# Patient Record
Sex: MALE | Race: WHITE | URBAN - METROPOLITAN AREA
[De-identification: names, ages, dates, MRNs, and addresses within clinical notes are randomized per-mention and may not be internally consistent; named-entity substitution may affect disease eponyms.]

---

## 2018-08-25 ENCOUNTER — EMERGENCY (EMERGENCY)
Facility: HOSPITAL | Age: 33
LOS: 0 days | Discharge: HOME | End: 2018-08-25
Attending: EMERGENCY MEDICINE | Admitting: EMERGENCY MEDICINE

## 2018-08-25 VITALS
TEMPERATURE: 98 F | SYSTOLIC BLOOD PRESSURE: 161 MMHG | DIASTOLIC BLOOD PRESSURE: 99 MMHG | OXYGEN SATURATION: 97 % | RESPIRATION RATE: 20 BRPM | HEART RATE: 102 BPM

## 2018-08-25 VITALS
OXYGEN SATURATION: 96 % | RESPIRATION RATE: 20 BRPM | DIASTOLIC BLOOD PRESSURE: 84 MMHG | SYSTOLIC BLOOD PRESSURE: 137 MMHG | HEART RATE: 80 BPM

## 2018-08-25 DIAGNOSIS — R60.0 LOCALIZED EDEMA: ICD-10-CM

## 2018-08-25 DIAGNOSIS — M79.89 OTHER SPECIFIED SOFT TISSUE DISORDERS: ICD-10-CM

## 2018-08-25 LAB
ALBUMIN SERPL ELPH-MCNC: 4.4 G/DL — SIGNIFICANT CHANGE UP (ref 3.5–5.2)
ALP SERPL-CCNC: 97 U/L — SIGNIFICANT CHANGE UP (ref 30–115)
ALT FLD-CCNC: 63 U/L — HIGH (ref 0–41)
ANION GAP SERPL CALC-SCNC: 11 MMOL/L — SIGNIFICANT CHANGE UP (ref 7–14)
APPEARANCE UR: CLEAR — SIGNIFICANT CHANGE UP
AST SERPL-CCNC: 50 U/L — HIGH (ref 0–41)
BASE EXCESS BLDA CALC-SCNC: 3.8 MMOL/L — HIGH (ref -2–2)
BASOPHILS # BLD AUTO: 0.06 K/UL — SIGNIFICANT CHANGE UP (ref 0–0.2)
BASOPHILS NFR BLD AUTO: 0.6 % — SIGNIFICANT CHANGE UP (ref 0–1)
BILIRUB SERPL-MCNC: 0.5 MG/DL — SIGNIFICANT CHANGE UP (ref 0.2–1.2)
BILIRUB UR-MCNC: NEGATIVE — SIGNIFICANT CHANGE UP
BUN SERPL-MCNC: 8 MG/DL — LOW (ref 10–20)
CALCIUM SERPL-MCNC: 9.6 MG/DL — SIGNIFICANT CHANGE UP (ref 8.5–10.1)
CHLORIDE SERPL-SCNC: 98 MMOL/L — SIGNIFICANT CHANGE UP (ref 98–110)
CO2 SERPL-SCNC: 30 MMOL/L — SIGNIFICANT CHANGE UP (ref 17–32)
COLOR SPEC: YELLOW — SIGNIFICANT CHANGE UP
CREAT SERPL-MCNC: 1.1 MG/DL — SIGNIFICANT CHANGE UP (ref 0.7–1.5)
DIFF PNL FLD: NEGATIVE — SIGNIFICANT CHANGE UP
EOSINOPHIL # BLD AUTO: 0.28 K/UL — SIGNIFICANT CHANGE UP (ref 0–0.7)
EOSINOPHIL NFR BLD AUTO: 2.7 % — SIGNIFICANT CHANGE UP (ref 0–8)
GAS PNL BLDV: SIGNIFICANT CHANGE UP
GAS PNL BLDV: SIGNIFICANT CHANGE UP
GLUCOSE SERPL-MCNC: 128 MG/DL — HIGH (ref 70–99)
GLUCOSE UR QL: NEGATIVE MG/DL — SIGNIFICANT CHANGE UP
HCO3 BLDA-SCNC: 28 MMOL/L — HIGH (ref 23–27)
HCT VFR BLD CALC: 47.4 % — SIGNIFICANT CHANGE UP (ref 42–52)
HGB BLD-MCNC: 16.1 G/DL — SIGNIFICANT CHANGE UP (ref 14–18)
HOROWITZ INDEX BLDA+IHG-RTO: 30 — SIGNIFICANT CHANGE UP
IMM GRANULOCYTES NFR BLD AUTO: 0.5 % — HIGH (ref 0.1–0.3)
KETONES UR-MCNC: NEGATIVE — SIGNIFICANT CHANGE UP
LEUKOCYTE ESTERASE UR-ACNC: NEGATIVE — SIGNIFICANT CHANGE UP
LYMPHOCYTES # BLD AUTO: 19.3 % — LOW (ref 20.5–51.1)
LYMPHOCYTES # BLD AUTO: 2 K/UL — SIGNIFICANT CHANGE UP (ref 1.2–3.4)
MCHC RBC-ENTMCNC: 28.9 PG — SIGNIFICANT CHANGE UP (ref 27–31)
MCHC RBC-ENTMCNC: 34 G/DL — SIGNIFICANT CHANGE UP (ref 32–37)
MCV RBC AUTO: 85.1 FL — SIGNIFICANT CHANGE UP (ref 80–94)
MONOCYTES # BLD AUTO: 0.72 K/UL — HIGH (ref 0.1–0.6)
MONOCYTES NFR BLD AUTO: 6.9 % — SIGNIFICANT CHANGE UP (ref 1.7–9.3)
NEUTROPHILS # BLD AUTO: 7.25 K/UL — HIGH (ref 1.4–6.5)
NEUTROPHILS NFR BLD AUTO: 70 % — SIGNIFICANT CHANGE UP (ref 42.2–75.2)
NITRITE UR-MCNC: NEGATIVE — SIGNIFICANT CHANGE UP
PCO2 BLDA: 42 MMHG — SIGNIFICANT CHANGE UP (ref 38–42)
PH BLDA: 7.44 — HIGH (ref 7.38–7.42)
PH UR: 6 — SIGNIFICANT CHANGE UP (ref 5–8)
PLATELET # BLD AUTO: 265 K/UL — SIGNIFICANT CHANGE UP (ref 130–400)
PO2 BLDA: 104 MMHG — HIGH (ref 78–95)
POTASSIUM SERPL-MCNC: 4 MMOL/L — SIGNIFICANT CHANGE UP (ref 3.5–5)
POTASSIUM SERPL-SCNC: 4 MMOL/L — SIGNIFICANT CHANGE UP (ref 3.5–5)
PROT SERPL-MCNC: 7.7 G/DL — SIGNIFICANT CHANGE UP (ref 6–8)
PROT UR-MCNC: ABNORMAL MG/DL
RBC # BLD: 5.57 M/UL — SIGNIFICANT CHANGE UP (ref 4.7–6.1)
RBC # FLD: 13.2 % — SIGNIFICANT CHANGE UP (ref 11.5–14.5)
SAO2 % BLDA: 98 % — SIGNIFICANT CHANGE UP (ref 94–98)
SODIUM SERPL-SCNC: 139 MMOL/L — SIGNIFICANT CHANGE UP (ref 135–146)
SP GR SPEC: 1.02 — SIGNIFICANT CHANGE UP (ref 1.01–1.03)
UROBILINOGEN FLD QL: 0.2 MG/DL — SIGNIFICANT CHANGE UP (ref 0.2–0.2)
WBC # BLD: 10.36 K/UL — SIGNIFICANT CHANGE UP (ref 4.8–10.8)
WBC # FLD AUTO: 10.36 K/UL — SIGNIFICANT CHANGE UP (ref 4.8–10.8)

## 2018-08-25 RX ORDER — DEXTROSE 50 % IN WATER 50 %
50 SYRINGE (ML) INTRAVENOUS ONCE
Qty: 0 | Refills: 0 | Status: COMPLETED | OUTPATIENT
Start: 2018-08-25 | End: 2018-08-25

## 2018-08-25 RX ORDER — SODIUM CHLORIDE 9 MG/ML
1000 INJECTION INTRAMUSCULAR; INTRAVENOUS; SUBCUTANEOUS ONCE
Qty: 0 | Refills: 0 | Status: COMPLETED | OUTPATIENT
Start: 2018-08-25 | End: 2018-08-25

## 2018-08-25 RX ORDER — INSULIN HUMAN 100 [IU]/ML
10 INJECTION, SOLUTION SUBCUTANEOUS ONCE
Qty: 0 | Refills: 0 | Status: COMPLETED | OUTPATIENT
Start: 2018-08-25 | End: 2018-08-25

## 2018-08-25 RX ORDER — IPRATROPIUM/ALBUTEROL SULFATE 18-103MCG
3 AEROSOL WITH ADAPTER (GRAM) INHALATION
Qty: 0 | Refills: 0 | Status: COMPLETED | OUTPATIENT
Start: 2018-08-25 | End: 2018-08-25

## 2018-08-25 NOTE — ED PROVIDER NOTE - PROGRESS NOTE DETAILS
Pt with hyperkalemia on initial CMP and VBG. Normal values obtained on subsequent VBG and ABG without interventions given to lower K.

## 2018-08-25 NOTE — ED ADULT NURSE REASSESSMENT NOTE - NS ED NURSE REASSESS COMMENT FT1
Pt assessed, aox4, cardiac monitored. HR 72 in normal sinus rhythm. Pt denies chest pain, SOB, dizziness. Pt placed on cpap machine oxygen saturation 97% no respiratory distress noted. Pt resting comfortably, safety precautions maintained.

## 2018-08-25 NOTE — ED PROVIDER NOTE - NS ED ROS FT
Constitutional: No fever  ENMT:  No sore throat  Cardiac:  No chest pain  Respiratory:  No cough, SOB  GI:  No nausea, vomiting, diarrhea, abdominal pain  :  No dysuria  MS:  +hand and feet swelling  Neuro:  No headache or lightheadedness  Skin: +rash  Endocrine: No history of thyroid disease or diabetes

## 2018-08-25 NOTE — ED PROVIDER NOTE - PHYSICAL EXAMINATION
Vital signs reviewed  GENERAL: Patient well appearing, NAD  HEAD: NCAT  EYES: Anicteric  ENT: MMM  NECK: Supple, non tender  RESPIRATORY: Normal respiratory effort. CTA B/L. No wheezing, rales, rhonchi  CARDIOVASCULAR: Regular rate and rhythm. No murmurs, rubs or gallops  ABDOMEN: Soft. Nondistended. Nontender. No guarding or rebound  MUSCULOSKELETAL/EXTREMITIES: Brisk cap refill. 2+ radial pulses. B/L hand and feet nonpitting edema. Normal ROM of extremities, 5/5 strength  SKIN:  Warm and dry. No acute rash  NEURO: AAOx3. No gross FND  PSYCHIATRIC: Cooperative. Affect appropriate

## 2018-08-25 NOTE — ED PROVIDER NOTE - ATTENDING CONTRIBUTION TO CARE
32M to ED with swelling to hands and feet x 1d, no fevers, no sick contacts, no travels.   pt has been working out door a lot but denies any trauma or pain. No h/o focal neuro sx, no known kidney dz.     AVSS, exam as noted, CTAB, RRR, abdomen soft NTND, (+) bowel sounds, neuro nonfocal , mild edema to hands and feet, not pitting, no oral edema, no scrotal edema.

## 2018-08-25 NOTE — ED ADULT NURSE NOTE - CHPI ED NUR SYMPTOMS NEG
swelling of lower and upper extremities/no fever/no loss of consciousness/no decreased eating/drinking/no back pain/no chills/no headache/no pain/no nausea/no vomiting/no dizziness

## 2018-08-25 NOTE — ED PROVIDER NOTE - OBJECTIVE STATEMENT
32M with no sig PMHx p/w swelling of hands and feet since yesterday. Pt noticed his bracelet was feeling tight around his wrist and had difficulty getting on his shoes. Having discomfort but not pain from the swelling. No trauma. Denies fever, joint pain, myalgia, rash. Denies headache, lightheadedness, CP, SOB, cough, nausea, vomiting, diarrhea, abd pain. No recent illness or travel.

## 2018-12-23 ENCOUNTER — EMERGENCY (EMERGENCY)
Facility: HOSPITAL | Age: 33
LOS: 0 days | Discharge: HOME | End: 2018-12-23
Admitting: EMERGENCY MEDICINE

## 2018-12-23 VITALS
RESPIRATION RATE: 18 BRPM | DIASTOLIC BLOOD PRESSURE: 85 MMHG | SYSTOLIC BLOOD PRESSURE: 134 MMHG | OXYGEN SATURATION: 97 % | TEMPERATURE: 97 F | HEART RATE: 87 BPM

## 2018-12-23 DIAGNOSIS — M25.562 PAIN IN LEFT KNEE: ICD-10-CM
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
## 2018-12-23 DIAGNOSIS — M54.2 CERVICALGIA: ICD-10-CM
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
## 2018-12-23 RX ORDER — IBUPROFEN 200 MG
800 TABLET ORAL ONCE
Qty: 0 | Refills: 0 | Status: COMPLETED | OUTPATIENT
Start: 2018-12-23 | End: 2018-12-23

## 2018-12-23 RX ORDER — METHOCARBAMOL 500 MG/1
1000 TABLET, FILM COATED ORAL ONCE
Qty: 0 | Refills: 0 | Status: COMPLETED | OUTPATIENT
Start: 2018-12-23 | End: 2018-12-23

## 2018-12-23 RX ORDER — IBUPROFEN 200 MG
1 TABLET ORAL
Qty: 21 | Refills: 0
Start: 2018-12-23 | End: 2018-12-29

## 2018-12-23 RX ORDER — METHOCARBAMOL 500 MG/1
2 TABLET, FILM COATED ORAL
Qty: 30 | Refills: 0
Start: 2018-12-23 | End: 2018-12-27

## 2018-12-23 RX ADMIN — METHOCARBAMOL 1000 MILLIGRAM(S): 500 TABLET, FILM COATED ORAL at 13:39

## 2018-12-23 RX ADMIN — Medication 800 MILLIGRAM(S): at 13:39
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
## 2018-12-23 NOTE — ED PROVIDER NOTE - CARE PROVIDER_API CALL
Ernesto Millard (MD), Orthopaedic Surgery  [facility] [place] [address]  Phone: [phone]  Fax: [phone]
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
## 2019-02-25 PROBLEM — Z00.00 ENCOUNTER FOR PREVENTIVE HEALTH EXAMINATION: Status: ACTIVE | Noted: 2019-02-25

## 2019-03-26 ENCOUNTER — TRANSCRIPTION ENCOUNTER (OUTPATIENT)
Age: 34
End: 2019-03-26

## 2019-03-26 ENCOUNTER — APPOINTMENT (OUTPATIENT)
Dept: SURGERY | Facility: CLINIC | Age: 34
End: 2019-03-26
Payer: SELF-PAY

## 2019-03-26 VITALS
DIASTOLIC BLOOD PRESSURE: 90 MMHG | WEIGHT: 315 LBS | HEIGHT: 72.5 IN | BODY MASS INDEX: 42.2 KG/M2 | SYSTOLIC BLOOD PRESSURE: 139 MMHG

## 2019-03-26 PROCEDURE — SI006: CPT

## 2019-03-28 ENCOUNTER — TRANSCRIPTION ENCOUNTER (OUTPATIENT)
Age: 34
End: 2019-03-28

## 2019-04-10 ENCOUNTER — APPOINTMENT (OUTPATIENT)
Dept: SURGERY | Facility: CLINIC | Age: 34
End: 2019-04-10
Payer: COMMERCIAL

## 2019-04-10 VITALS
HEIGHT: 72.5 IN | WEIGHT: 315 LBS | BODY MASS INDEX: 42.2 KG/M2 | DIASTOLIC BLOOD PRESSURE: 86 MMHG | SYSTOLIC BLOOD PRESSURE: 138 MMHG

## 2019-04-10 DIAGNOSIS — Z83.3 FAMILY HISTORY OF DIABETES MELLITUS: ICD-10-CM

## 2019-04-10 PROCEDURE — 99204 OFFICE O/P NEW MOD 45 MIN: CPT

## 2019-04-16 NOTE — PHYSICAL EXAM
[Normal Heart Sounds] : normal heart sounds [No Rash or Lesion] : No rash or lesion [Alert] : alert [Calm] : calm [de-identified] : no acute distress [de-identified] : supple [de-identified] : NC/AT [de-identified] : CTA B/L [de-identified] : soft, obese, non-tender, non-distended, no rebound/guarding, no hernias [de-identified] : no CVA tenderness [de-identified] : deferred [de-identified] : full ROm x 4, 5+ strength x 4

## 2019-04-16 NOTE — CONSULT LETTER
[Consult Letter:] : I had the pleasure of evaluating your patient, [unfilled]. [Please see my note below.] : Please see my note below. [Consult Closing:] : Thank you very much for allowing me to participate in the care of this patient.  If you have any questions, please do not hesitate to contact me. [Sincerely,] : Sincerely, [Dear  ___] : Dear  [unfilled], [FreeTextEntry3] : Clover Roque MD\par Bariatric & Minimally Invasive Surgery\par [facility]\par [phone]

## 2019-04-16 NOTE — ASSESSMENT
[FreeTextEntry1] : 34yo male with PMHx of HTN presenting for weight loss surgery consultation. \par -discussed surgical options - gastric band, sleeve gastrectomy, carey-en-Y gastric bypass\par -discussed potential surgical complications for each option\par -discussed smoking is prohibited and recommended cessation - referred to [facility] \par -recommended high protein, low carb, low fat diet\par -encouraged exercise regimen - 30 minutes per day, 3 times per week\par -at this time, I recommend SLEEVE GASTRECTOMY for this particular patient\par -pre-operative preparation - will require PCP evaluation, EGD, nutritional evaluation (3 months), sleep study\par -return to office after further preoperative evaluation is completed\par

## 2019-04-16 NOTE — REVIEW OF SYSTEMS
[SOB on Exertion] : shortness of breath during exertion [Joint Pain] : joint pain [As Noted in HPI] : as noted in HPI [Negative] : Heme/Lymph [de-identified] : depression requiring hospitalization 2009

## 2019-04-16 NOTE — HISTORY OF PRESENT ILLNESS
[de-identified] : 32yo male with PMHx of HTN, SHAKIR, and history of depression BMI 45 presenting for consultation of weight loss surgery/management. Patient states he has struggled with his weight for years. He has a tendency to eat sweets. He feels that contributing factors to his poor diet and lack of exercise is limited time, general stress, and lack of motivation. Previous weight loss attempts include P906 and various gym memberships with limited success. At this time, he does not have a regular exercise regimen.\par \par Previous EGD - never\par Smoker - 1/2 ppd x 12 years

## 2019-04-30 ENCOUNTER — APPOINTMENT (OUTPATIENT)
Dept: SURGERY | Facility: CLINIC | Age: 34
End: 2019-04-30
Payer: MEDICARE

## 2019-04-30 VITALS — HEIGHT: 72.5 IN | WEIGHT: 315 LBS | BODY MASS INDEX: 42.2 KG/M2

## 2019-04-30 PROCEDURE — 99212 OFFICE O/P EST SF 10 MIN: CPT

## 2019-05-05 ENCOUNTER — EMERGENCY (EMERGENCY)
Facility: HOSPITAL | Age: 34
LOS: 0 days | Discharge: HOME | End: 2019-05-05
Attending: EMERGENCY MEDICINE | Admitting: EMERGENCY MEDICINE
Payer: COMMERCIAL

## 2019-05-05 VITALS
DIASTOLIC BLOOD PRESSURE: 98 MMHG | OXYGEN SATURATION: 96 % | SYSTOLIC BLOOD PRESSURE: 155 MMHG | HEART RATE: 105 BPM | RESPIRATION RATE: 18 BRPM | TEMPERATURE: 97 F

## 2019-05-05 VITALS
DIASTOLIC BLOOD PRESSURE: 78 MMHG | OXYGEN SATURATION: 97 % | HEART RATE: 88 BPM | TEMPERATURE: 99 F | SYSTOLIC BLOOD PRESSURE: 145 MMHG | RESPIRATION RATE: 18 BRPM

## 2019-05-05 DIAGNOSIS — Z79.1 LONG TERM (CURRENT) USE OF NON-STEROIDAL ANTI-INFLAMMATORIES (NSAID): ICD-10-CM

## 2019-05-05 DIAGNOSIS — F32.9 MAJOR DEPRESSIVE DISORDER, SINGLE EPISODE, UNSPECIFIED: ICD-10-CM

## 2019-05-05 DIAGNOSIS — R45.1 RESTLESSNESS AND AGITATION: ICD-10-CM

## 2019-05-05 DIAGNOSIS — F17.200 NICOTINE DEPENDENCE, UNSPECIFIED, UNCOMPLICATED: ICD-10-CM

## 2019-05-05 PROCEDURE — 99283 EMERGENCY DEPT VISIT LOW MDM: CPT
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
## 2019-05-05 NOTE — ED PROVIDER NOTE - ATTENDING CONTRIBUTION TO CARE
see clinical summary.  depression without active SI.  Doesn't want to voluntarily see psych in the ED.  stable for discharge to outpatient follow up.

## 2019-05-05 NOTE — ED ADULT NURSE REASSESSMENT NOTE - NS ED NURSE REASSESS COMMENT FT1
pt stable to be discharge. Pt has no plan for suicide. Pt states will be going to see his therapist. Pt AOx4.

## 2019-05-05 NOTE — ED PROVIDER NOTE - PHYSICAL EXAMINATION
VITAL SIGNS: I have reviewed nursing notes and confirm.  CONSTITUTIONAL: Well-developed; well-nourished; in no acute distress, obese  CARD: RRR, 2+ dp pulses  RESP: No wheezes, rales or rhonchi, speaking in full sentences  ABD: soft non tender.   EXT: Normal ROM. No edema.  NEURO: Alert, oriented. Grossly unremarkable. No focal deficits.  PSYCH: Cooperative, appropriate.

## 2019-05-05 NOTE — ED PROVIDER NOTE - NSFOLLOWUPCLINICS_GEN_ALL_ED_FT
[facility]  [facility]  [address] [place] [id number]  Phone: [phone]  Fax:   Follow Up Time:

## 2019-05-05 NOTE — ED PROVIDER NOTE - OBJECTIVE STATEMENT
34 y/o M, no significant PMH, presents with depression onset today. states he broke up with the mother of his child about two months ago and has been feeling intermittently depressed and lonely. he does see a therapist, has an appointment tomorrow. states he is in the ED today because he expressed SI to the mother of his child in an attempt to get back together with her. currently not suicidal and does not have a plan. denies fever, chills,a bd pain, n/v, chest pain. SI, HI, drug use, hallucinations, etoh.
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
## 2019-05-23 ENCOUNTER — APPOINTMENT (OUTPATIENT)
Dept: SURGERY | Facility: CLINIC | Age: 34
End: 2019-05-23
Payer: COMMERCIAL

## 2019-05-23 VITALS — WEIGHT: 315 LBS | BODY MASS INDEX: 42.2 KG/M2 | HEIGHT: 72.5 IN

## 2019-05-23 PROCEDURE — 99212 OFFICE O/P EST SF 10 MIN: CPT

## 2019-06-11 ENCOUNTER — APPOINTMENT (OUTPATIENT)
Dept: SURGERY | Facility: CLINIC | Age: 34
End: 2019-06-11

## 2019-07-30 ENCOUNTER — APPOINTMENT (OUTPATIENT)
Dept: SURGERY | Facility: CLINIC | Age: 34
End: 2019-07-30
Payer: COMMERCIAL

## 2019-07-30 VITALS — BODY MASS INDEX: 42.2 KG/M2 | HEIGHT: 72.5 IN | WEIGHT: 315 LBS

## 2019-07-30 PROCEDURE — 99212 OFFICE O/P EST SF 10 MIN: CPT

## 2019-08-27 ENCOUNTER — APPOINTMENT (OUTPATIENT)
Dept: SURGERY | Facility: CLINIC | Age: 34
End: 2019-08-27

## 2019-08-28 ENCOUNTER — APPOINTMENT (OUTPATIENT)
Dept: CARDIOLOGY | Facility: CLINIC | Age: 34
End: 2019-08-28

## 2019-08-29 ENCOUNTER — APPOINTMENT (OUTPATIENT)
Dept: SURGERY | Facility: CLINIC | Age: 34
End: 2019-08-29
Payer: COMMERCIAL

## 2019-08-29 VITALS — BODY MASS INDEX: 42.2 KG/M2 | WEIGHT: 315 LBS | HEIGHT: 72.5 IN

## 2019-08-29 PROCEDURE — 99212 OFFICE O/P EST SF 10 MIN: CPT

## 2019-10-24 ENCOUNTER — APPOINTMENT (OUTPATIENT)
Dept: SURGERY | Facility: CLINIC | Age: 34
End: 2019-10-24

## 2019-11-27 ENCOUNTER — CLINICAL ADVICE (OUTPATIENT)
Age: 34
End: 2019-11-27

## 2020-01-21 NOTE — ED ADULT NURSE NOTE - NS ED NURSE RECORD ANOTHER HT AND WT
No DVT ppx:  will treat with mechanical DVT ppx - SCDs, patient is actively bleeding at this time. chronic, stable  - continue propanolol ER 60mg daily

## 2020-05-20 ENCOUNTER — APPOINTMENT (OUTPATIENT)
Dept: SURGERY | Facility: CLINIC | Age: 35
End: 2020-05-20

## 2020-07-22 ENCOUNTER — APPOINTMENT (OUTPATIENT)
Dept: SURGERY | Facility: CLINIC | Age: 35
End: 2020-07-22
Payer: COMMERCIAL

## 2020-07-22 VITALS
HEIGHT: 72.5 IN | BODY MASS INDEX: 42.2 KG/M2 | HEART RATE: 94 BPM | WEIGHT: 315 LBS | TEMPERATURE: 97.6 F | DIASTOLIC BLOOD PRESSURE: 90 MMHG | SYSTOLIC BLOOD PRESSURE: 129 MMHG

## 2020-07-22 DIAGNOSIS — M51.26 OTHER INTERVERTEBRAL DISC DISPLACEMENT, LUMBAR REGION: ICD-10-CM

## 2020-07-22 DIAGNOSIS — Z86.59 PERSONAL HISTORY OF OTHER MENTAL AND BEHAVIORAL DISORDERS: ICD-10-CM

## 2020-07-22 PROCEDURE — 99214 OFFICE O/P EST MOD 30 MIN: CPT

## 2020-07-24 PROBLEM — Z86.59 HISTORY OF DEPRESSION: Status: RESOLVED | Noted: 2020-07-24 | Resolved: 2020-07-24

## 2020-07-24 PROBLEM — M51.26 HERNIATION OF INTERVERTEBRAL DISC OF LUMBAR SPINE: Status: ACTIVE | Noted: 2020-07-24

## 2020-07-24 NOTE — HISTORY OF PRESENT ILLNESS
[de-identified] : 33yo male with PMHx of HTN, SHAKIR, L4-5 herniated disc, L knee arthritis, depression (hospitalized 2009) and morbid obesity BMI 45.6 presenting for consultation of weight loss surgery/management. Patient was previously seen at [facility] for bariatric surgical weight loss program 4/2019, but discontinued participation for personal reasons. Previous weight loss attempts include various diets and exercise regimens with limited success. Patient reports dyspnea upon exertion, but never at rest. He denies heart burn symptoms, no prior EGD. No colonoscopy.

## 2020-07-24 NOTE — PHYSICAL EXAM
[Obese, well nourished, in no acute distress] : obese, well nourished, in no acute distress [Normal] : affect appropriate [de-identified] : no CVA tenderness B/L [de-identified] : obese, soft, non-tender, no rebound/guarding

## 2020-07-24 NOTE — ASSESSMENT
[FreeTextEntry1] : 35yo male with PMHx of HTN, SHAKIR, L4-5 herniated disc, L knee arthritis, depression (hospitalized 2009) and morbid obesity BMI 45.6 presenting for consultation of weight loss surgery/management. \par -discussed surgical options - gastric band, sleeve gastrectomy, carey-en-Y gastric bypass\par -discussed potential surgical complications for each option - including bleeding, infection, pain, hernia, leak, stricture, and blood clots\par -discussed smoking of any kind (cigarettes, marijuana, e-cigarettes) is prohibited - I am concerned that he is still a smoker - 1/2 ppd x 13-14 years. Encouraged to quit smoking, will refer to smoking cessation group\par -at this time, the patient is interested in SLEEVE GASTRECTOMY\par -I informed him that there may be findings on EGD that disqualify him from sleeve, particularly sandoval's esophagus.\par -recommend high protein, low carb, low fat diet with protein shakes\par -encourage exercise regimen - starting with 10 minutes per day combining cardio and resistance training with the goal of 30 minutes of exercise 4 times per week\par -next step - bariatric education session at nearest convenience \par -pre-operative preparation - will include PCP evaluation, cardiac evaluation, pulmonary evaluation, EGD, nutritional evaluation (3 months), labs and US

## 2020-07-24 NOTE — CONSULT LETTER
[Courtesy Letter:] : I had the pleasure of seeing your patient, [unfilled], in my office today. [Sincerely,] : Sincerely, [Please see my note below.] : Please see my note below. [Dear  ___] : Dear  [unfilled], [FreeTextEntry3] : Clover Roque MD\par Bariatric & Minimally Invasive Surgery\par [facility]\par [phone]\par

## 2020-08-05 ENCOUNTER — APPOINTMENT (OUTPATIENT)
Dept: SURGERY | Facility: CLINIC | Age: 35
End: 2020-08-05
Payer: SELF-PAY

## 2020-08-05 PROCEDURE — SI006: CPT

## 2020-08-06 ENCOUNTER — APPOINTMENT (OUTPATIENT)
Dept: SURGERY | Facility: CLINIC | Age: 35
End: 2020-08-06
Payer: COMMERCIAL

## 2020-08-06 VITALS — HEIGHT: 72.5 IN | WEIGHT: 315 LBS | BODY MASS INDEX: 42.2 KG/M2

## 2020-08-06 PROCEDURE — ZZZZZ: CPT

## 2020-08-17 ENCOUNTER — NON-APPOINTMENT (OUTPATIENT)
Age: 35
End: 2020-08-17

## 2020-08-24 ENCOUNTER — OUTPATIENT (OUTPATIENT)
Dept: OUTPATIENT SERVICES | Facility: HOSPITAL | Age: 35
LOS: 1 days | Discharge: HOME | End: 2020-08-24
Payer: COMMERCIAL

## 2020-08-24 ENCOUNTER — RESULT REVIEW (OUTPATIENT)
Age: 35
End: 2020-08-24

## 2020-08-24 DIAGNOSIS — E66.01 MORBID (SEVERE) OBESITY DUE TO EXCESS CALORIES: ICD-10-CM

## 2020-08-24 PROCEDURE — 76700 US EXAM ABDOM COMPLETE: CPT | Mod: 26

## 2020-08-25 ENCOUNTER — OUTPATIENT (OUTPATIENT)
Dept: OUTPATIENT SERVICES | Facility: HOSPITAL | Age: 35
LOS: 1 days | Discharge: HOME | End: 2020-08-25

## 2020-08-25 ENCOUNTER — APPOINTMENT (OUTPATIENT)
Dept: CARDIOLOGY | Facility: CLINIC | Age: 35
End: 2020-08-25
Payer: COMMERCIAL

## 2020-08-25 VITALS
TEMPERATURE: 97.2 F | WEIGHT: 243 LBS | HEIGHT: 71 IN | DIASTOLIC BLOOD PRESSURE: 80 MMHG | HEART RATE: 74 BPM | SYSTOLIC BLOOD PRESSURE: 116 MMHG | BODY MASS INDEX: 34.02 KG/M2

## 2020-08-25 VITALS
TEMPERATURE: 97.5 F | HEIGHT: 73 IN | SYSTOLIC BLOOD PRESSURE: 142 MMHG | DIASTOLIC BLOOD PRESSURE: 96 MMHG | HEART RATE: 105 BPM | WEIGHT: 315 LBS | BODY MASS INDEX: 41.75 KG/M2

## 2020-08-25 DIAGNOSIS — Z87.891 PERSONAL HISTORY OF NICOTINE DEPENDENCE: ICD-10-CM

## 2020-08-25 DIAGNOSIS — Z13.6 ENCOUNTER FOR SCREENING FOR CARDIOVASCULAR DISORDERS: ICD-10-CM

## 2020-08-25 DIAGNOSIS — F50.9 EATING DISORDER, UNSPECIFIED: ICD-10-CM

## 2020-08-25 DIAGNOSIS — Z01.810 ENCOUNTER FOR PREPROCEDURAL CARDIOVASCULAR EXAMINATION: ICD-10-CM

## 2020-08-25 PROCEDURE — 99204 OFFICE O/P NEW MOD 45 MIN: CPT

## 2020-08-25 PROCEDURE — 93000 ELECTROCARDIOGRAM COMPLETE: CPT

## 2020-09-10 ENCOUNTER — APPOINTMENT (OUTPATIENT)
Dept: SURGERY | Facility: CLINIC | Age: 35
End: 2020-09-10
Payer: COMMERCIAL

## 2020-09-10 VITALS — HEIGHT: 73 IN | WEIGHT: 315 LBS | BODY MASS INDEX: 41.75 KG/M2

## 2020-09-10 PROCEDURE — ZZZZZ: CPT

## 2020-09-14 ENCOUNTER — APPOINTMENT (OUTPATIENT)
Dept: UROLOGY | Facility: CLINIC | Age: 35
End: 2020-09-14

## 2020-10-08 ENCOUNTER — APPOINTMENT (OUTPATIENT)
Dept: SURGERY | Facility: CLINIC | Age: 35
End: 2020-10-08
Payer: COMMERCIAL

## 2020-10-08 VITALS — BODY MASS INDEX: 41.75 KG/M2 | WEIGHT: 315 LBS | HEIGHT: 73 IN

## 2020-10-08 PROCEDURE — ZZZZZ: CPT

## 2020-10-17 ENCOUNTER — APPOINTMENT (OUTPATIENT)
Dept: CARDIOLOGY | Facility: CLINIC | Age: 35
End: 2020-10-17
Payer: COMMERCIAL

## 2020-10-17 PROCEDURE — 93306 TTE W/DOPPLER COMPLETE: CPT

## 2020-10-27 NOTE — HISTORY OF PRESENT ILLNESS
[FreeTextEntry1] : \par 35 yo M with h/o HTN (never on medication), former smoker (quit one week ago) referred for risk stratification prior to bariatric surgery.  BP is normal today.  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

## 2020-10-27 NOTE — PHYSICAL EXAM
[General Appearance - Well Developed] : well developed [General Appearance - In No Acute Distress] : no acute distress [Normal Conjunctiva] : the conjunctiva exhibited no abnormalities [Eyelids - No Xanthelasma] : the eyelids demonstrated no xanthelasmas [Respiration, Rhythm And Depth] : normal respiratory rhythm and effort [Exaggerated Use Of Accessory Muscles For Inspiration] : no accessory muscle use [Heart Rate And Rhythm] : heart rate and rhythm were normal [Auscultation Breath Sounds / Voice Sounds] : lungs were clear to auscultation bilaterally [Abdomen Soft] : soft [Heart Sounds] : normal S1 and S2 [Murmurs] : no murmurs present [Abdomen Mass (___ Cm)] : no abdominal mass palpated [Abdomen Tenderness] : non-tender [Gait - Sufficient For Exercise Testing] : the gait was sufficient for exercise testing [Abnormal Walk] : normal gait [Skin Color & Pigmentation] : normal skin color and pigmentation [Cyanosis, Localized] : no localized cyanosis [Nail Clubbing] : no clubbing of the fingernails [] : no rash [No Skin Ulcers] : no skin ulcer [Oriented To Time, Place, And Person] : oriented to person, place, and time [FreeTextEntry1] : no JVD

## 2020-10-27 NOTE — ADDENDUM
[FreeTextEntry1] : Echo reviewed (see scanned report)\par \par Low-risk for intermediate-risk surgery

## 2020-11-12 ENCOUNTER — APPOINTMENT (OUTPATIENT)
Dept: SURGERY | Facility: CLINIC | Age: 35
End: 2020-11-12
Payer: COMMERCIAL

## 2020-11-12 VITALS — BODY MASS INDEX: 42.07 KG/M2 | WEIGHT: 314 LBS | HEIGHT: 72.5 IN

## 2020-11-12 PROCEDURE — ZZZZZ: CPT

## 2020-11-29 ENCOUNTER — OUTPATIENT (OUTPATIENT)
Dept: OUTPATIENT SERVICES | Facility: HOSPITAL | Age: 35
LOS: 1 days | Discharge: HOME | End: 2020-11-29

## 2020-11-29 DIAGNOSIS — Z11.59 ENCOUNTER FOR SCREENING FOR OTHER VIRAL DISEASES: ICD-10-CM

## 2020-12-02 ENCOUNTER — OUTPATIENT (OUTPATIENT)
Dept: OUTPATIENT SERVICES | Facility: HOSPITAL | Age: 35
LOS: 1 days | Discharge: HOME | End: 2020-12-02

## 2020-12-03 DIAGNOSIS — G47.33 OBSTRUCTIVE SLEEP APNEA (ADULT) (PEDIATRIC): ICD-10-CM

## 2020-12-04 VITALS — BODY MASS INDEX: 42.07 KG/M2 | HEIGHT: 72.5 IN | WEIGHT: 314 LBS

## 2020-12-23 PROBLEM — Z01.810 ENCOUNTER FOR PRE-OPERATIVE CARDIOVASCULAR CLEARANCE: Status: RESOLVED | Noted: 2020-08-25 | Resolved: 2020-12-23

## 2021-01-09 ENCOUNTER — OUTPATIENT (OUTPATIENT)
Dept: OUTPATIENT SERVICES | Facility: HOSPITAL | Age: 36
LOS: 1 days | Discharge: HOME | End: 2021-01-09

## 2021-01-09 DIAGNOSIS — Z11.59 ENCOUNTER FOR SCREENING FOR OTHER VIRAL DISEASES: ICD-10-CM

## 2021-02-16 ENCOUNTER — OUTPATIENT (OUTPATIENT)
Dept: OUTPATIENT SERVICES | Facility: HOSPITAL | Age: 36
LOS: 1 days | Discharge: HOME | End: 2021-02-16
Payer: COMMERCIAL

## 2021-02-16 VITALS
RESPIRATION RATE: 16 BRPM | DIASTOLIC BLOOD PRESSURE: 80 MMHG | OXYGEN SATURATION: 98 % | HEART RATE: 85 BPM | SYSTOLIC BLOOD PRESSURE: 130 MMHG | HEIGHT: 73 IN | TEMPERATURE: 98 F | WEIGHT: 313.94 LBS

## 2021-02-16 DIAGNOSIS — E66.01 MORBID (SEVERE) OBESITY DUE TO EXCESS CALORIES: ICD-10-CM

## 2021-02-16 DIAGNOSIS — Z01.818 ENCOUNTER FOR OTHER PREPROCEDURAL EXAMINATION: ICD-10-CM

## 2021-02-16 DIAGNOSIS — Z87.81 PERSONAL HISTORY OF (HEALED) TRAUMATIC FRACTURE: Chronic | ICD-10-CM

## 2021-02-16 LAB
A1C WITH ESTIMATED AVERAGE GLUCOSE RESULT: 6.3 % — HIGH (ref 4–5.6)
ALBUMIN SERPL ELPH-MCNC: 4.4 G/DL — SIGNIFICANT CHANGE UP (ref 3.5–5.2)
ALP SERPL-CCNC: 98 U/L — SIGNIFICANT CHANGE UP (ref 30–115)
ALT FLD-CCNC: 33 U/L — SIGNIFICANT CHANGE UP (ref 0–41)
ANION GAP SERPL CALC-SCNC: 20 MMOL/L — HIGH (ref 7–14)
APPEARANCE UR: CLEAR — SIGNIFICANT CHANGE UP
APTT BLD: 38 SEC — SIGNIFICANT CHANGE UP (ref 27–39.2)
AST SERPL-CCNC: 28 U/L — SIGNIFICANT CHANGE UP (ref 0–41)
BASOPHILS # BLD AUTO: 0.05 K/UL — SIGNIFICANT CHANGE UP (ref 0–0.2)
BASOPHILS NFR BLD AUTO: 0.6 % — SIGNIFICANT CHANGE UP (ref 0–1)
BILIRUB SERPL-MCNC: 0.5 MG/DL — SIGNIFICANT CHANGE UP (ref 0.2–1.2)
BILIRUB UR-MCNC: NEGATIVE — SIGNIFICANT CHANGE UP
BLD GP AB SCN SERPL QL: SIGNIFICANT CHANGE UP
BUN SERPL-MCNC: 12 MG/DL — SIGNIFICANT CHANGE UP (ref 10–20)
CALCIUM SERPL-MCNC: 10.2 MG/DL — HIGH (ref 8.5–10.1)
CHLORIDE SERPL-SCNC: 102 MMOL/L — SIGNIFICANT CHANGE UP (ref 98–110)
CO2 SERPL-SCNC: 22 MMOL/L — SIGNIFICANT CHANGE UP (ref 17–32)
COLOR SPEC: YELLOW — SIGNIFICANT CHANGE UP
CREAT SERPL-MCNC: 1.3 MG/DL — SIGNIFICANT CHANGE UP (ref 0.7–1.5)
DIFF PNL FLD: NEGATIVE — SIGNIFICANT CHANGE UP
EOSINOPHIL # BLD AUTO: 0.17 K/UL — SIGNIFICANT CHANGE UP (ref 0–0.7)
EOSINOPHIL NFR BLD AUTO: 1.9 % — SIGNIFICANT CHANGE UP (ref 0–8)
ESTIMATED AVERAGE GLUCOSE: 134 MG/DL — HIGH (ref 68–114)
GLUCOSE SERPL-MCNC: 91 MG/DL — SIGNIFICANT CHANGE UP (ref 70–99)
GLUCOSE UR QL: NEGATIVE — SIGNIFICANT CHANGE UP
HCT VFR BLD CALC: 49.7 % — SIGNIFICANT CHANGE UP (ref 42–52)
HGB BLD-MCNC: 16.5 G/DL — SIGNIFICANT CHANGE UP (ref 14–18)
IMM GRANULOCYTES NFR BLD AUTO: 0.3 % — SIGNIFICANT CHANGE UP (ref 0.1–0.3)
INR BLD: 1.11 RATIO — SIGNIFICANT CHANGE UP (ref 0.65–1.3)
KETONES UR-MCNC: NEGATIVE — SIGNIFICANT CHANGE UP
LEUKOCYTE ESTERASE UR-ACNC: NEGATIVE — SIGNIFICANT CHANGE UP
LYMPHOCYTES # BLD AUTO: 1.41 K/UL — SIGNIFICANT CHANGE UP (ref 1.2–3.4)
LYMPHOCYTES # BLD AUTO: 16 % — LOW (ref 20.5–51.1)
MCHC RBC-ENTMCNC: 28.6 PG — SIGNIFICANT CHANGE UP (ref 27–31)
MCHC RBC-ENTMCNC: 33.2 G/DL — SIGNIFICANT CHANGE UP (ref 32–37)
MCV RBC AUTO: 86.3 FL — SIGNIFICANT CHANGE UP (ref 80–94)
MONOCYTES # BLD AUTO: 0.64 K/UL — HIGH (ref 0.1–0.6)
MONOCYTES NFR BLD AUTO: 7.2 % — SIGNIFICANT CHANGE UP (ref 1.7–9.3)
NEUTROPHILS # BLD AUTO: 6.53 K/UL — HIGH (ref 1.4–6.5)
NEUTROPHILS NFR BLD AUTO: 74 % — SIGNIFICANT CHANGE UP (ref 42.2–75.2)
NITRITE UR-MCNC: NEGATIVE — SIGNIFICANT CHANGE UP
NRBC # BLD: 0 /100 WBCS — SIGNIFICANT CHANGE UP (ref 0–0)
PH UR: 6 — SIGNIFICANT CHANGE UP (ref 5–8)
PLATELET # BLD AUTO: 286 K/UL — SIGNIFICANT CHANGE UP (ref 130–400)
POTASSIUM SERPL-MCNC: 4.5 MMOL/L — SIGNIFICANT CHANGE UP (ref 3.5–5)
POTASSIUM SERPL-SCNC: 4.5 MMOL/L — SIGNIFICANT CHANGE UP (ref 3.5–5)
PROT SERPL-MCNC: 7.5 G/DL — SIGNIFICANT CHANGE UP (ref 6–8)
PROT UR-MCNC: SIGNIFICANT CHANGE UP
PROTHROM AB SERPL-ACNC: 12.8 SEC — SIGNIFICANT CHANGE UP (ref 9.95–12.87)
RBC # BLD: 5.76 M/UL — SIGNIFICANT CHANGE UP (ref 4.7–6.1)
RBC # FLD: 13.2 % — SIGNIFICANT CHANGE UP (ref 11.5–14.5)
SODIUM SERPL-SCNC: 144 MMOL/L — SIGNIFICANT CHANGE UP (ref 135–146)
SP GR SPEC: 1.02 — SIGNIFICANT CHANGE UP (ref 1.01–1.03)
UROBILINOGEN FLD QL: SIGNIFICANT CHANGE UP
WBC # BLD: 8.83 K/UL — SIGNIFICANT CHANGE UP (ref 4.8–10.8)
WBC # FLD AUTO: 8.83 K/UL — SIGNIFICANT CHANGE UP (ref 4.8–10.8)

## 2021-02-16 PROCEDURE — 93010 ELECTROCARDIOGRAM REPORT: CPT

## 2021-02-16 NOTE — H&P PST ADULT - HISTORY OF PRESENT ILLNESS
Pt complains of    Denies any chest pain, difficulty breathing, SOB, palpitations, dysuria, URI, or any other infections in the last 2 weeks/1 month. Denies any recent travel, contact, or exposure to any persons with known or suspected COVID-19. Pt also denies COVID testing within the last 2 weeks. Pt advised to self quarantine until day of procedure. Exercise tolerance of 2-3 flights of stairs without dyspnea. SHAKIR reviewed with patient.    Anesthesia Alert  NO--Difficult Airway  NO--History of neck surgery or radiation  NO--Limited ROM of neck  NO--History of Malignant hyperthermia  NO--Personal or family history of Pseudocholinesterase deficiency  NO--Prior Anesthesia Complication  NO--Latex Allergy  NO--Loose teeth  NO--History of Rheumatoid Arthritis  YES--SHAKIR C pap  NO--Other_____     written and verbal instructions with teach back on chlorhexidine shampoo provided,  pt verbalized understanding with returned demonstration   Pt complains of excessive weight since childhood, sleep apnea (on C pap machine) diagnosed a month ago, lower back and knee pain for years. Pt is scheduled for gastric sleeve weight loss surgery on 3/8 at [facility] OR with DR Clover Roque.    Denies any chest pain, difficulty breathing, SOB, palpitations, dysuria, URI, or any other infections in the last 2 weeks/1 month. Denies any recent travel, contact, or exposure to any persons with known or suspected COVID-19. Pt also denies COVID testing within the last 2 weeks. Pt advised to self quarantine until day of procedure. Exercise tolerance of 2-3 flights of stairs without dyspnea. SHAKIR reviewed with patient.    Anesthesia Alert  NO--Difficult Airway  NO--History of neck surgery or radiation  NO--Limited ROM of neck  NO--History of Malignant hyperthermia  NO--Personal or family history of Pseudocholinesterase deficiency  NO--Prior Anesthesia Complication  NO--Latex Allergy  NO--Loose teeth  NO--History of Rheumatoid Arthritis  YES--SHAKIR C pap  NO--Other_____     written and verbal instructions with teach back on chlorhexidine shampoo provided,  pt verbalized understanding with returned demonstration

## 2021-02-16 NOTE — H&P PST ADULT - ASSESSMENT
GEN: NAD, OBESE  Neuro: A&Ox3.  No focal deficits.  Moving all extremities.   HEENT: No obvious abnormalities  CV: S1S2, regular, no murmurs appreciated.  No carotid bruits.  No JVD  Lungs: Clear B/L.  No wheezing, rales or rhonchi  ABD: Soft, non-tender, non-distended.  +Bowel sounds  EXT: Warm and well perfused.  No peripheral edema noted  PV: Pedal pulses palpable   GEN: NAD, OBESE  Neuro: A&Ox3.  No focal deficits.  Moving all extremities.   HEENT: No obvious abnormalities  CV: S1S2, regular, no murmurs appreciated.  No carotid bruits.  No JVD  Lungs: Clear B/L.  No wheezing, rales or rhonchi  ABD: Soft, non-tender, non-distended.  +Bowel sounds, +obese  EXT: Warm and well perfused.  No peripheral edema noted  PV: Pedal pulses palpable

## 2021-02-16 NOTE — H&P PST ADULT - REASON FOR ADMISSION
laparoscopic sleeve gastrectomy, possible open, possible intraoperative endoscopy scheduled on 3/8 under general anesthesia at [facility] OR with DR Clover Roque

## 2021-02-16 NOTE — H&P PST ADULT - NSICDXPASTMEDICALHX_GEN_ALL_CORE_FT
PAST MEDICAL HISTORY:  Obesity BMI >40     PAST MEDICAL HISTORY:  Bilateral knee pain     Lower back pain     Obesity BMI >40    Obstructive sleep apnea on CPAP

## 2021-02-17 LAB
CULTURE RESULTS: SIGNIFICANT CHANGE UP
SPECIMEN SOURCE: SIGNIFICANT CHANGE UP

## 2021-03-05 ENCOUNTER — APPOINTMENT (OUTPATIENT)
Dept: SURGERY | Facility: CLINIC | Age: 36
End: 2021-03-05
Payer: COMMERCIAL

## 2021-03-05 ENCOUNTER — OUTPATIENT (OUTPATIENT)
Dept: OUTPATIENT SERVICES | Facility: HOSPITAL | Age: 36
LOS: 1 days | Discharge: HOME | End: 2021-03-05

## 2021-03-05 ENCOUNTER — LABORATORY RESULT (OUTPATIENT)
Age: 36
End: 2021-03-05

## 2021-03-05 VITALS
WEIGHT: 315 LBS | HEART RATE: 85 BPM | TEMPERATURE: 97.3 F | DIASTOLIC BLOOD PRESSURE: 90 MMHG | BODY MASS INDEX: 42.2 KG/M2 | OXYGEN SATURATION: 98 % | SYSTOLIC BLOOD PRESSURE: 150 MMHG | HEIGHT: 72.5 IN

## 2021-03-05 DIAGNOSIS — Z87.81 PERSONAL HISTORY OF (HEALED) TRAUMATIC FRACTURE: Chronic | ICD-10-CM

## 2021-03-05 DIAGNOSIS — Z11.59 ENCOUNTER FOR SCREENING FOR OTHER VIRAL DISEASES: ICD-10-CM

## 2021-03-05 PROBLEM — G47.33 OBSTRUCTIVE SLEEP APNEA (ADULT) (PEDIATRIC): Chronic | Status: ACTIVE | Noted: 2021-02-16

## 2021-03-05 PROBLEM — M54.5 LOW BACK PAIN: Chronic | Status: ACTIVE | Noted: 2021-02-16

## 2021-03-05 PROBLEM — E66.9 OBESITY, UNSPECIFIED: Chronic | Status: ACTIVE | Noted: 2021-02-16

## 2021-03-05 PROBLEM — M25.561 PAIN IN RIGHT KNEE: Chronic | Status: ACTIVE | Noted: 2021-02-16

## 2021-03-05 PROCEDURE — 99214 OFFICE O/P EST MOD 30 MIN: CPT

## 2021-03-05 PROCEDURE — 99072 ADDL SUPL MATRL&STAF TM PHE: CPT

## 2021-03-05 NOTE — HISTORY OF PRESENT ILLNESS
[de-identified] : 36yo male with PMHx of HTN, SHAKIR, L4-5 herniated disc, L knee arthritis, depression (hospitalized 2009) and morbid obesity BMI 45.6 preparing for weight loss surgery, scheduled for laparoscopic sleeve gastrectomy 3/8/2021. Patient has been evaluated by Cardiology, Gastroenterology, Pulmonology, Psych, and his PCP. He has undergone monitored dieting with Nutritionist.

## 2021-03-05 NOTE — PHYSICAL EXAM
[Obese, well nourished, in no acute distress] : obese, well nourished, in no acute distress [Normal] : affect appropriate [de-identified] : obese, soft, non-tender, no rebound/guarding [de-identified] : no CVA tenderness B/L

## 2021-03-05 NOTE — ASSESSMENT
[FreeTextEntry1] : 34yo male with PMHx of HTN, SHAKIR, L4-5 herniated disc, L knee arthritis, depression (hospitalized 2009) and morbid obesity BMI 45.6 preparing for bariatric surgery, scheduled on 3/8/2021. \par -evaluations by cardiology, gastroenterology, pulmonology, psychology and medicine have been reviewed\par -consent obtained for LAPAROSCOPIC SLEEVE GASTRECTOMY, POSSIBLE OPEN, POSSIBLE INTRA-OPERATIVE ENDOSCOPY, AND ALL INDICATED PROCEDURES\par -post-operative instructions reviewed - diet, wound care, concerning symptoms\par -All medications were reviewed with the patient and instructions were given in respect to medications on the day of surgery. Written instructions provided.\par -Rx sent for Gabapentin 250 MG/5ML Oral Solution; TAKE 2.5 ML Every 8 hours\par -Rx sent for Pantoprazole Sodium 40mg PO delayed release daily\par -return to office post-operatively as scheduled\par -call with concerns\par

## 2021-03-05 NOTE — CONSULT LETTER
[Dear  ___] : Dear  [unfilled], [Courtesy Letter:] : I had the pleasure of seeing your patient, [unfilled], in my office today. [Please see my note below.] : Please see my note below. [Sincerely,] : Sincerely, [FreeTextEntry3] : Clover Roque MD FACS\par Bariatric & Minimally Invasive Surgery\par [facility]\par [phone]

## 2021-03-08 ENCOUNTER — APPOINTMENT (OUTPATIENT)
Dept: SURGERY | Facility: HOSPITAL | Age: 36
End: 2021-03-08

## 2021-03-08 ENCOUNTER — RESULT REVIEW (OUTPATIENT)
Age: 36
End: 2021-03-08

## 2021-03-08 ENCOUNTER — INPATIENT (INPATIENT)
Facility: HOSPITAL | Age: 36
LOS: 1 days | Discharge: HOME | End: 2021-03-10
Attending: STUDENT IN AN ORGANIZED HEALTH CARE EDUCATION/TRAINING PROGRAM | Admitting: STUDENT IN AN ORGANIZED HEALTH CARE EDUCATION/TRAINING PROGRAM
Payer: COMMERCIAL

## 2021-03-08 VITALS
RESPIRATION RATE: 18 BRPM | OXYGEN SATURATION: 96 % | SYSTOLIC BLOOD PRESSURE: 161 MMHG | DIASTOLIC BLOOD PRESSURE: 101 MMHG | TEMPERATURE: 98 F | WEIGHT: 309.97 LBS | HEART RATE: 91 BPM | HEIGHT: 73 IN

## 2021-03-08 DIAGNOSIS — R00.0 TACHYCARDIA, UNSPECIFIED: ICD-10-CM

## 2021-03-08 DIAGNOSIS — I16.0 HYPERTENSIVE URGENCY: ICD-10-CM

## 2021-03-08 DIAGNOSIS — Z87.81 PERSONAL HISTORY OF (HEALED) TRAUMATIC FRACTURE: Chronic | ICD-10-CM

## 2021-03-08 DIAGNOSIS — G47.33 OBSTRUCTIVE SLEEP APNEA (ADULT) (PEDIATRIC): ICD-10-CM

## 2021-03-08 DIAGNOSIS — E66.01 MORBID (SEVERE) OBESITY DUE TO EXCESS CALORIES: ICD-10-CM

## 2021-03-08 LAB
ABO RH CONFIRMATION: SIGNIFICANT CHANGE UP
ANION GAP SERPL CALC-SCNC: 11 MMOL/L — SIGNIFICANT CHANGE UP (ref 7–14)
APTT BLD: 31.4 SEC — SIGNIFICANT CHANGE UP (ref 27–39.2)
BUN SERPL-MCNC: 13 MG/DL — SIGNIFICANT CHANGE UP (ref 10–20)
CALCIUM SERPL-MCNC: 9.1 MG/DL — SIGNIFICANT CHANGE UP (ref 8.5–10.1)
CHLORIDE SERPL-SCNC: 103 MMOL/L — SIGNIFICANT CHANGE UP (ref 98–110)
CK MB CFR SERPL CALC: 6.1 NG/ML — SIGNIFICANT CHANGE UP (ref 0.6–6.3)
CK SERPL-CCNC: 633 U/L — HIGH (ref 0–225)
CO2 SERPL-SCNC: 23 MMOL/L — SIGNIFICANT CHANGE UP (ref 17–32)
CREAT SERPL-MCNC: 1.3 MG/DL — SIGNIFICANT CHANGE UP (ref 0.7–1.5)
GLUCOSE BLDC GLUCOMTR-MCNC: 179 MG/DL — HIGH (ref 70–99)
GLUCOSE BLDC GLUCOMTR-MCNC: 99 MG/DL — SIGNIFICANT CHANGE UP (ref 70–99)
GLUCOSE SERPL-MCNC: 194 MG/DL — HIGH (ref 70–99)
HCT VFR BLD CALC: 48.6 % — SIGNIFICANT CHANGE UP (ref 42–52)
HGB BLD-MCNC: 16.5 G/DL — SIGNIFICANT CHANGE UP (ref 14–18)
INR BLD: 1.1 RATIO — SIGNIFICANT CHANGE UP (ref 0.65–1.3)
MAGNESIUM SERPL-MCNC: 2 MG/DL — SIGNIFICANT CHANGE UP (ref 1.8–2.4)
MCHC RBC-ENTMCNC: 29.3 PG — SIGNIFICANT CHANGE UP (ref 27–31)
MCHC RBC-ENTMCNC: 34 G/DL — SIGNIFICANT CHANGE UP (ref 32–37)
MCV RBC AUTO: 86.2 FL — SIGNIFICANT CHANGE UP (ref 80–94)
NRBC # BLD: 0 /100 WBCS — SIGNIFICANT CHANGE UP (ref 0–0)
PHOSPHATE SERPL-MCNC: 4.3 MG/DL — SIGNIFICANT CHANGE UP (ref 2.1–4.9)
PLATELET # BLD AUTO: 328 K/UL — SIGNIFICANT CHANGE UP (ref 130–400)
POTASSIUM SERPL-MCNC: 4.6 MMOL/L — SIGNIFICANT CHANGE UP (ref 3.5–5)
POTASSIUM SERPL-SCNC: 4.6 MMOL/L — SIGNIFICANT CHANGE UP (ref 3.5–5)
PROTHROM AB SERPL-ACNC: 12.7 SEC — SIGNIFICANT CHANGE UP (ref 9.95–12.87)
RBC # BLD: 5.64 M/UL — SIGNIFICANT CHANGE UP (ref 4.7–6.1)
RBC # FLD: 13.2 % — SIGNIFICANT CHANGE UP (ref 11.5–14.5)
SODIUM SERPL-SCNC: 137 MMOL/L — SIGNIFICANT CHANGE UP (ref 135–146)
TROPONIN T SERPL-MCNC: <0.01 NG/ML — SIGNIFICANT CHANGE UP
WBC # BLD: 20.06 K/UL — HIGH (ref 4.8–10.8)
WBC # FLD AUTO: 20.06 K/UL — HIGH (ref 4.8–10.8)

## 2021-03-08 PROCEDURE — 43775 LAP SLEEVE GASTRECTOMY: CPT

## 2021-03-08 PROCEDURE — 93010 ELECTROCARDIOGRAM REPORT: CPT

## 2021-03-08 PROCEDURE — 99222 1ST HOSP IP/OBS MODERATE 55: CPT | Mod: 25

## 2021-03-08 PROCEDURE — 88305 TISSUE EXAM BY PATHOLOGIST: CPT | Mod: 26

## 2021-03-08 RX ORDER — HEPARIN SODIUM 5000 [USP'U]/ML
5000 INJECTION INTRAVENOUS; SUBCUTANEOUS EVERY 8 HOURS
Refills: 0 | Status: DISCONTINUED | OUTPATIENT
Start: 2021-03-08 | End: 2021-03-10

## 2021-03-08 RX ORDER — HYDRALAZINE HCL 50 MG
10 TABLET ORAL ONCE
Refills: 0 | Status: COMPLETED | OUTPATIENT
Start: 2021-03-08 | End: 2021-03-08

## 2021-03-08 RX ORDER — ACETAMINOPHEN 500 MG
1000 TABLET ORAL ONCE
Refills: 0 | Status: COMPLETED | OUTPATIENT
Start: 2021-03-08 | End: 2021-03-08

## 2021-03-08 RX ORDER — SODIUM CHLORIDE 9 MG/ML
1000 INJECTION, SOLUTION INTRAVENOUS
Refills: 0 | Status: DISCONTINUED | OUTPATIENT
Start: 2021-03-08 | End: 2021-03-10

## 2021-03-08 RX ORDER — BUPIVACAINE 13.3 MG/ML
20 INJECTION, SUSPENSION, LIPOSOMAL INFILTRATION ONCE
Refills: 0 | Status: DISCONTINUED | OUTPATIENT
Start: 2021-03-08 | End: 2021-03-08

## 2021-03-08 RX ORDER — HYDROMORPHONE HYDROCHLORIDE 2 MG/ML
0.25 INJECTION INTRAMUSCULAR; INTRAVENOUS; SUBCUTANEOUS EVERY 6 HOURS
Refills: 0 | Status: DISCONTINUED | OUTPATIENT
Start: 2021-03-08 | End: 2021-03-10

## 2021-03-08 RX ORDER — SODIUM CHLORIDE 9 MG/ML
1000 INJECTION, SOLUTION INTRAVENOUS
Refills: 0 | Status: DISCONTINUED | OUTPATIENT
Start: 2021-03-08 | End: 2021-03-08

## 2021-03-08 RX ORDER — ACETAMINOPHEN 500 MG
1000 TABLET ORAL ONCE
Refills: 0 | Status: COMPLETED | OUTPATIENT
Start: 2021-03-09 | End: 2021-03-09

## 2021-03-08 RX ORDER — ACETAMINOPHEN 500 MG
1000 TABLET ORAL ONCE
Refills: 0 | Status: COMPLETED | OUTPATIENT
Start: 2021-03-08 | End: 2021-03-09

## 2021-03-08 RX ORDER — KETOROLAC TROMETHAMINE 30 MG/ML
15 SYRINGE (ML) INJECTION EVERY 6 HOURS
Refills: 0 | Status: DISCONTINUED | OUTPATIENT
Start: 2021-03-08 | End: 2021-03-10

## 2021-03-08 RX ORDER — GABAPENTIN 400 MG/1
125 CAPSULE ORAL THREE TIMES A DAY
Refills: 0 | Status: DISCONTINUED | OUTPATIENT
Start: 2021-03-09 | End: 2021-03-10

## 2021-03-08 RX ORDER — PANTOPRAZOLE SODIUM 20 MG/1
40 TABLET, DELAYED RELEASE ORAL DAILY
Refills: 0 | Status: DISCONTINUED | OUTPATIENT
Start: 2021-03-08 | End: 2021-03-10

## 2021-03-08 RX ORDER — HYDRALAZINE HCL 50 MG
10 TABLET ORAL ONCE
Refills: 0 | Status: DISCONTINUED | OUTPATIENT
Start: 2021-03-08 | End: 2021-03-08

## 2021-03-08 RX ORDER — HEPARIN SODIUM 5000 [USP'U]/ML
5000 INJECTION INTRAVENOUS; SUBCUTANEOUS ONCE
Refills: 0 | Status: COMPLETED | OUTPATIENT
Start: 2021-03-08 | End: 2021-03-08

## 2021-03-08 RX ORDER — CEFOTETAN DISODIUM 1 G
2 VIAL (EA) INJECTION EVERY 12 HOURS
Refills: 0 | Status: COMPLETED | OUTPATIENT
Start: 2021-03-08 | End: 2021-03-09

## 2021-03-08 RX ORDER — ACETAMINOPHEN 500 MG
1000 TABLET ORAL ONCE
Refills: 0 | Status: DISCONTINUED | OUTPATIENT
Start: 2021-03-08 | End: 2021-03-08

## 2021-03-08 RX ORDER — CLEVIDIPINE BUTYRATE 50MG/100ML
1 VIAL (ML) INTRAVENOUS
Qty: 25 | Refills: 0 | Status: DISCONTINUED | OUTPATIENT
Start: 2021-03-08 | End: 2021-03-10

## 2021-03-08 RX ORDER — PROCHLORPERAZINE MALEATE 5 MG
5 TABLET ORAL EVERY 6 HOURS
Refills: 0 | Status: DISCONTINUED | OUTPATIENT
Start: 2021-03-08 | End: 2021-03-08

## 2021-03-08 RX ORDER — MEPERIDINE HYDROCHLORIDE 50 MG/ML
12.5 INJECTION INTRAMUSCULAR; INTRAVENOUS; SUBCUTANEOUS
Refills: 0 | Status: DISCONTINUED | OUTPATIENT
Start: 2021-03-08 | End: 2021-03-08

## 2021-03-08 RX ORDER — HYDROMORPHONE HYDROCHLORIDE 2 MG/ML
0.25 INJECTION INTRAMUSCULAR; INTRAVENOUS; SUBCUTANEOUS EVERY 4 HOURS
Refills: 0 | Status: DISCONTINUED | OUTPATIENT
Start: 2021-03-08 | End: 2021-03-08

## 2021-03-08 RX ORDER — HYDROMORPHONE HYDROCHLORIDE 2 MG/ML
1 INJECTION INTRAMUSCULAR; INTRAVENOUS; SUBCUTANEOUS
Refills: 0 | Status: DISCONTINUED | OUTPATIENT
Start: 2021-03-08 | End: 2021-03-08

## 2021-03-08 RX ORDER — ONDANSETRON 8 MG/1
4 TABLET, FILM COATED ORAL EVERY 6 HOURS
Refills: 0 | Status: DISCONTINUED | OUTPATIENT
Start: 2021-03-08 | End: 2021-03-10

## 2021-03-08 RX ORDER — HYDROMORPHONE HYDROCHLORIDE 2 MG/ML
0.5 INJECTION INTRAMUSCULAR; INTRAVENOUS; SUBCUTANEOUS
Refills: 0 | Status: DISCONTINUED | OUTPATIENT
Start: 2021-03-08 | End: 2021-03-08

## 2021-03-08 RX ORDER — ONDANSETRON 8 MG/1
4 TABLET, FILM COATED ORAL ONCE
Refills: 0 | Status: COMPLETED | OUTPATIENT
Start: 2021-03-08 | End: 2021-03-08

## 2021-03-08 RX ADMIN — Medication 15 MILLIGRAM(S): at 18:32

## 2021-03-08 RX ADMIN — Medication 1.25 MILLIGRAM(S): at 15:33

## 2021-03-08 RX ADMIN — Medication 10 MILLIGRAM(S): at 10:50

## 2021-03-08 RX ADMIN — HYDROMORPHONE HYDROCHLORIDE 1 MILLIGRAM(S): 2 INJECTION INTRAMUSCULAR; INTRAVENOUS; SUBCUTANEOUS at 10:25

## 2021-03-08 RX ADMIN — Medication 1.25 MILLIGRAM(S): at 11:42

## 2021-03-08 RX ADMIN — HEPARIN SODIUM 5000 UNIT(S): 5000 INJECTION INTRAVENOUS; SUBCUTANEOUS at 07:10

## 2021-03-08 RX ADMIN — ONDANSETRON 4 MILLIGRAM(S): 8 TABLET, FILM COATED ORAL at 23:47

## 2021-03-08 RX ADMIN — Medication 2.5 MILLIGRAM(S): at 22:34

## 2021-03-08 RX ADMIN — SODIUM CHLORIDE 120 MILLILITER(S): 9 INJECTION, SOLUTION INTRAVENOUS at 10:17

## 2021-03-08 RX ADMIN — Medication 15 MILLIGRAM(S): at 18:41

## 2021-03-08 RX ADMIN — Medication 100 GRAM(S): at 18:32

## 2021-03-08 RX ADMIN — ONDANSETRON 4 MILLIGRAM(S): 8 TABLET, FILM COATED ORAL at 10:16

## 2021-03-08 RX ADMIN — Medication 15 MILLIGRAM(S): at 13:15

## 2021-03-08 RX ADMIN — Medication 2.5 MILLIGRAM(S): at 17:45

## 2021-03-08 RX ADMIN — HYDROMORPHONE HYDROCHLORIDE 1 MILLIGRAM(S): 2 INJECTION INTRAMUSCULAR; INTRAVENOUS; SUBCUTANEOUS at 10:45

## 2021-03-08 RX ADMIN — HEPARIN SODIUM 5000 UNIT(S): 5000 INJECTION INTRAVENOUS; SUBCUTANEOUS at 14:50

## 2021-03-08 RX ADMIN — Medication 15 MILLIGRAM(S): at 12:58

## 2021-03-08 RX ADMIN — SODIUM CHLORIDE 100 MILLILITER(S): 9 INJECTION, SOLUTION INTRAVENOUS at 11:22

## 2021-03-08 RX ADMIN — PANTOPRAZOLE SODIUM 40 MILLIGRAM(S): 20 TABLET, DELAYED RELEASE ORAL at 11:46

## 2021-03-08 RX ADMIN — Medication 400 MILLIGRAM(S): at 18:34

## 2021-03-08 RX ADMIN — ONDANSETRON 4 MILLIGRAM(S): 8 TABLET, FILM COATED ORAL at 15:53

## 2021-03-08 RX ADMIN — Medication 1000 MILLIGRAM(S): at 18:41

## 2021-03-08 RX ADMIN — HEPARIN SODIUM 5000 UNIT(S): 5000 INJECTION INTRAVENOUS; SUBCUTANEOUS at 22:34

## 2021-03-08 NOTE — BRIEF OPERATIVE NOTE - NSICDXBRIEFPROCEDURE_GEN_ALL_CORE_FT
PROCEDURES:  Block, transversus abdominis plane, bilateral 08-Mar-2021 09:46:12  Roseanna Johnson  Laparoscopic sleeve gastrectomy 08-Mar-2021 09:46:02  Roseanna Johnson

## 2021-03-08 NOTE — CONSULT NOTE ADULT - SUBJECTIVE AND OBJECTIVE BOX
SICU Consultation Note  =====================================================  HPI: 35y Male  HPI:   35 year old male with a history of morbid obesity (BMI 43), SHAKIR on CPAP, previous smoker (1PPD, 30 years) chronic back and b/l knee pain,  failed attempts at outpatient weight loss management; SICU consulted for close monitoring post laparoscopic sleeve gastrectomy on 3/8.     Surgery Information  OR time: 89 minutes   EBL:  10 cc      IV Fluids: 1400 LR   Blood Products: none UOP: 0 mL      PAST MEDICAL & SURGICAL HISTORY:  Bilateral knee pain    Lower back pain    Obstructive sleep apnea on CPAP    Obesity  BMI &gt;40    Status post fracture of left tibia  5/1999      Home Meds: Home Medications:    Allergies: Allergies    No Known Allergies    Intolerances      Soc:   Advanced Directives: Presumed Full Code     ROS:    REVIEW OF SYSTEMS    [X] Due to altered mental status subjective information were not able to be obtained from the patient. History was obtained, to the extent possible, from review of the chart and collateral sources of information.      CURRENT MEDICATIONS:   --------------------------------------------------------------------------------------  Neurologic Medications  acetaminophen  IVPB .. 1000 milliGRAM(s) IV Intermittent once  acetaminophen  IVPB .. 1000 milliGRAM(s) IV Intermittent once  HYDROmorphone  Injectable 0.25 milliGRAM(s) IV Push every 6 hours PRN Severe Pain (7 - 10)  ketorolac   Injectable 15 milliGRAM(s) IV Push every 6 hours  ondansetron Injectable 4 milliGRAM(s) IV Push every 6 hours PRN Nausea    Respiratory Medications    Cardiovascular Medications    Gastrointestinal Medications  lactated ringers. 1000 milliLiter(s) IV Continuous <Continuous>  pantoprazole  Injectable 40 milliGRAM(s) IV Push daily    Genitourinary Medications    Hematologic/Oncologic Medications  heparin   Injectable 5000 Unit(s) SubCutaneous every 8 hours    Antimicrobial/Immunologic Medications  cefoTEtan  IVPB 2 Gram(s) IV Intermittent every 12 hours    Endocrine/Metabolic Medications    Topical/Other Medications    --------------------------------------------------------------------------------------    VITAL SIGNS, INS/OUTS (last 24 hours):  --------------------------------------------------------------------------------------  ICU Vital Signs Last 24 Hrs  T(C): 37 (08 Mar 2021 10:45), Max: 37 (08 Mar 2021 10:45)  T(F): 98.6 (08 Mar 2021 10:45), Max: 98.6 (08 Mar 2021 10:45)  HR: 62 (08 Mar 2021 11:45) (57 - 91)  BP: 170/102 (08 Mar 2021 11:45) (161/101 - 211/139)  BP(mean): --  ABP: --  ABP(mean): --  RR: 23 (08 Mar 2021 11:45) (18 - 28)  SpO2: 98% (08 Mar 2021 11:45) (94% - 100%)    I&O's Summary    --------------------------------------------------------------------------------------    EXAM:  General/Neuro  RASS:   GCS:   Exam: Normal, NAD, alert, oriented x 3, no focal deficits. PERRLA  ***    Respiratory  Exam: Lungs clear to auscultation, Normal expansion/effort.  ***  [] Tracheostomy   [] Intubated  Mechanical Ventilation:     Cardiovascular  Exam: S1, S2.  Regular rate and rhythm.  Peripheral edema  ***  Cardiac Rhythm: Normal Sinus Rhythm  ECHO:     GI  Exam: Abdomen soft, Non-tender, Non-distended.  Gastrostomy / Jejunostomy tube in place.  Nasogastric tube in place.  Colostomy / Ileostomy.  ***  Wound:   ***  Current Diet:  NPO***      Tubes/Lines/Drains  ***  [x] Peripheral IV  [] Central Venous Line     	[] R	[] L	[] IJ	[] Fem	[] SC        Type:	    Date Placed:   [] Arterial Line		[] R	[] L	[] Fem	[] Rad	[] Ax	Date Placed:   [] PICC:         	[] Midline		[] Mediport           [] Urinary Catheter		Date Placed:     Extremities  Exam: Extremities warm, pink, well-perfused.        Derm:  Exam: Good skin turgor, no skin breakdown.      :   Exam: Parks catheter in place.     LABS  --------------------------------------------------------------------------------------  Labs:  CAPILLARY BLOOD GLUCOSE      POCT Blood Glucose.: 179 mg/dL (08 Mar 2021 11:07)  POCT Blood Glucose.: 99 mg/dL (08 Mar 2021 07:10)                          16.5   20.06 )-----------( 328      ( 08 Mar 2021 11:10 )             48.6         03-08    137  |  103  |  13  ----------------------------<  194<H>  4.6   |  23  |  1.3      Calcium, Total Serum: 9.1 mg/dL (03-08-21 @ 11:10)      LFTs:         Coags:    CARDIAC MARKERS ( 08 Mar 2021 11:10 )  x     / <0.01 ng/mL / 633 U/L / x     / 6.1 ng/mL                --------------------------------------------------------------------------------------    OTHER LABS    IMAGING RESULTS      --------------------------------------------------------------------------------------     SICU Consultation Note  =====================================================  HPI: 35y Male  HPI:   35 year old male with a history of morbid obesity (BMI 43), SHAKIR on CPAP, previous smoker (1PPD, 30 years) chronic back and b/l knee pain,  failed attempts at outpatient weight loss management; SICU consulted for close monitoring post laparoscopic sleeve gastrectomy on 3/8.     Surgery Information  OR time: 89 minutes   EBL:  10 cc      IV Fluids: 1400 LR   Blood Products: none UOP: 0 mL      PAST MEDICAL & SURGICAL HISTORY:  Bilateral knee pain    Lower back pain    Obstructive sleep apnea on CPAP    Obesity  BMI &gt;40    Status post fracture of left tibia  5/1999      Home Meds: Home Medications:    Allergies: Allergies    No Known Allergies    Intolerances      Soc:   Advanced Directives: Presumed Full Code     ROS:    REVIEW OF SYSTEMS    [X] Due to altered mental status subjective information were not able to be obtained from the patient. History was obtained, to the extent possible, from review of the chart and collateral sources of information.      CURRENT MEDICATIONS:   --------------------------------------------------------------------------------------  Neurologic Medications  acetaminophen  IVPB .. 1000 milliGRAM(s) IV Intermittent once  acetaminophen  IVPB .. 1000 milliGRAM(s) IV Intermittent once  HYDROmorphone  Injectable 0.25 milliGRAM(s) IV Push every 6 hours PRN Severe Pain (7 - 10)  ketorolac   Injectable 15 milliGRAM(s) IV Push every 6 hours  ondansetron Injectable 4 milliGRAM(s) IV Push every 6 hours PRN Nausea    Respiratory Medications    Cardiovascular Medications    Gastrointestinal Medications  lactated ringers. 1000 milliLiter(s) IV Continuous <Continuous>  pantoprazole  Injectable 40 milliGRAM(s) IV Push daily    Genitourinary Medications    Hematologic/Oncologic Medications  heparin   Injectable 5000 Unit(s) SubCutaneous every 8 hours    Antimicrobial/Immunologic Medications  cefoTEtan  IVPB 2 Gram(s) IV Intermittent every 12 hours    Endocrine/Metabolic Medications    Topical/Other Medications    --------------------------------------------------------------------------------------    VITAL SIGNS, INS/OUTS (last 24 hours):  --------------------------------------------------------------------------------------  ICU Vital Signs Last 24 Hrs  T(C): 37 (08 Mar 2021 10:45), Max: 37 (08 Mar 2021 10:45)  T(F): 98.6 (08 Mar 2021 10:45), Max: 98.6 (08 Mar 2021 10:45)  HR: 62 (08 Mar 2021 11:45) (57 - 91)  BP: 170/102 (08 Mar 2021 11:45) (161/101 - 211/139)  BP(mean): --  ABP: --  ABP(mean): --  RR: 23 (08 Mar 2021 11:45) (18 - 28)  SpO2: 98% (08 Mar 2021 11:45) (94% - 100%)    I&O's Summary    --------------------------------------------------------------------------------------    EXAM:  General/Neuro  Exam: Normal, NAD, no focal deficits.     Respiratory  Exam: Normal expansion/effort.     Cardiovascular  Exam: S1, S2.  Regular rate and rhythm.    Cardiac Rhythm: Normal Sinus Rhythm      GI  Exam: Abdomen soft, Non-tender, Non-distended. Wound:  4 laparoscopic wounds on abdomen; appears clean without drainage  Current Diet:  NPO      Tubes/Lines/Drains  ***  [x] Peripheral IV    Extremities  Exam: Extremities warm, pink, well-perfused.      Derm:  Exam: Good skin turgor, no skin breakdown.  4 laparoscopic wounds on abdomen; appears clean without drainage    :   Exam: No Parks.     LABS  --------------------------------------------------------------------------------------  Labs:  CAPILLARY BLOOD GLUCOSE      POCT Blood Glucose.: 179 mg/dL (08 Mar 2021 11:07)  POCT Blood Glucose.: 99 mg/dL (08 Mar 2021 07:10)                          16.5   20.06 )-----------( 328      ( 08 Mar 2021 11:10 )             48.6         03-08    137  |  103  |  13  ----------------------------<  194<H>  4.6   |  23  |  1.3      Calcium, Total Serum: 9.1 mg/dL (03-08-21 @ 11:10)      CARDIAC MARKERS ( 08 Mar 2021 11:10 )  x     / <0.01 ng/mL / 633 U/L / x     / 6.1 ng/mL        --------------------------------------------------------------------------------------    --------------------------------------------------------------------------------------     SICU Consultation Note  =====================================================  HPI: 35y Male  HPI:   35 year old male with a history of morbid obesity (BMI 43), SHAKIR on CPAP, previous smoker (1PPD, 30 years) chronic back and b/l knee pain,  failed attempts at outpatient weight loss management; SICU consulted for close monitoring post laparoscopic sleeve gastrectomy on 3/8.     Surgery Information  OR time: 89 minutes   EBL:  10 cc     IV Fluids: 1400 LR   Blood Products: none UOP: 0 mL      PAST MEDICAL & SURGICAL HISTORY:  Bilateral knee pain    Lower back pain    Obstructive sleep apnea on CPAP    Obesity  BMI &gt;40    Status post fracture of left tibia  5/1999      Home Meds: Home Medications:    Allergies: Allergies    No Known Allergies    Intolerances      Soc:   Advanced Directives: Presumed Full Code     ROS:    REVIEW OF SYSTEMS    [X] Due to altered mental status subjective information were not able to be obtained from the patient. History was obtained, to the extent possible, from review of the chart and collateral sources of information.      CURRENT MEDICATIONS:   --------------------------------------------------------------------------------------  Neurologic Medications  acetaminophen  IVPB .. 1000 milliGRAM(s) IV Intermittent once  acetaminophen  IVPB .. 1000 milliGRAM(s) IV Intermittent once  HYDROmorphone  Injectable 0.25 milliGRAM(s) IV Push every 6 hours PRN Severe Pain (7 - 10)  ketorolac   Injectable 15 milliGRAM(s) IV Push every 6 hours  ondansetron Injectable 4 milliGRAM(s) IV Push every 6 hours PRN Nausea    Respiratory Medications    Cardiovascular Medications    Gastrointestinal Medications  lactated ringers. 1000 milliLiter(s) IV Continuous <Continuous>  pantoprazole  Injectable 40 milliGRAM(s) IV Push daily    Genitourinary Medications    Hematologic/Oncologic Medications  heparin   Injectable 5000 Unit(s) SubCutaneous every 8 hours    Antimicrobial/Immunologic Medications  cefoTEtan  IVPB 2 Gram(s) IV Intermittent every 12 hours    Endocrine/Metabolic Medications    Topical/Other Medications    --------------------------------------------------------------------------------------    VITAL SIGNS, INS/OUTS (last 24 hours):  --------------------------------------------------------------------------------------  ICU Vital Signs Last 24 Hrs  T(C): 37 (08 Mar 2021 10:45), Max: 37 (08 Mar 2021 10:45)  T(F): 98.6 (08 Mar 2021 10:45), Max: 98.6 (08 Mar 2021 10:45)  HR: 62 (08 Mar 2021 11:45) (57 - 91)  BP: 170/102 (08 Mar 2021 11:45) (161/101 - 211/139)  BP(mean): --  ABP: --  ABP(mean): --  RR: 23 (08 Mar 2021 11:45) (18 - 28)  SpO2: 98% (08 Mar 2021 11:45) (94% - 100%)    I&O's Summary    --------------------------------------------------------------------------------------    EXAM:  General/Neuro  Exam: Normal, NAD, no focal deficits.     Respiratory  Exam: Normal expansion/effort.     Cardiovascular  Exam: S1, S2.  Regular rate and rhythm.    Cardiac Rhythm: Normal Sinus Rhythm      GI  Exam: Abdomen soft, Non-tender, Non-distended. Wound:  4 laparoscopic wounds on abdomen; appears clean without drainage  Current Diet:  NPO      Tubes/Lines/Drains  [x] Peripheral IV    Extremities  Exam: Extremities warm, pink, well-perfused.      Derm:  Exam: Good skin turgor, no skin breakdown.  4 laparoscopic wounds on abdomen; appears clean without drainage    :   Exam: No Parks.     LABS  --------------------------------------------------------------------------------------  Labs:  CAPILLARY BLOOD GLUCOSE      POCT Blood Glucose.: 179 mg/dL (08 Mar 2021 11:07)  POCT Blood Glucose.: 99 mg/dL (08 Mar 2021 07:10)                          16.5   20.06 )-----------( 328      ( 08 Mar 2021 11:10 )             48.6         03-08    137  |  103  |  13  ----------------------------<  194<H>  4.6   |  23  |  1.3      Calcium, Total Serum: 9.1 mg/dL (03-08-21 @ 11:10)      CARDIAC MARKERS ( 08 Mar 2021 11:10 )  x     / <0.01 ng/mL / 633 U/L / x     / 6.1 ng/mL        --------------------------------------------------------------------------------------    --------------------------------------------------------------------------------------

## 2021-03-08 NOTE — CONSULT NOTE ADULT - ATTENDING COMMENTS
36 y/o male with Morbid obesity.  S/P Lap. Sleeve gastrectomy.  SHAKIR.  Acute postoperative pain.    PLAN:  - pain control  - incentive spirometer; CPAP at night  - keep normotensive  - on CLD as per Surgery team  - follow serum electrolytes and UOP  - GI and DVT prophylaxis

## 2021-03-08 NOTE — CHART NOTE - NSCHARTNOTEFT_GEN_A_CORE
Post Operative Note  Patient: DOLORES MEEK 35y (1985) Male   MRN: [id number]  Location: [facility] [address]  Visit: 03-08-21 Inpatient  Date: 03-08-21 @ 15:48    Procedure: S/P sleeve    Subjective: Pt doing well. HTN and eduardo to 55, received 2x enalapril    Objective:  Vitals: T(F): 98 (03-08-21 @ 13:05), Max: 98.6 (03-08-21 @ 10:45)  HR: 63 (03-08-21 @ 15:30)  BP: 196/105 (03-08-21 @ 15:30) (161/101 - 211/139)  RR: 15 (03-08-21 @ 15:30)  SpO2: 96% (03-08-21 @ 15:30)  Vent Settings:     In:   03-08-21 @ 07:01  -  03-08-21 @ 15:48  --------------------------------------------------------  IN: 640 mL      IV Fluids: lactated ringers. 1000 milliLiter(s) (100 mL/Hr) IV Continuous <Continuous>      Out:   03-08-21 @ 07:01  -  03-08-21 @ 15:48  --------------------------------------------------------  OUT: 600 mL      EBL:     Voided Urine:   03-08-21 @ 07:01  -  03-08-21 @ 15:48  --------------------------------------------------------  OUT: 600 mL      Parks Catheter: yes no   Drains:   FREDRICK:    ,   Chest Tube:      NG Tube:       Physical Examination:  General Appearance: NAD  HEENT: EOMI, sclera non-icteric.  Heart: RRR  Lungs: CTABL  Abdomen:  Soft, nontender, nondistended. No rigidity, guarding, or rebound tenderness.   MSK/Extremities: Warm & well-perfused. Peripheral pulses intact.  Skin: Warm, dry. No jaundice.   Incisions/Wounds: Dressings in place, clean, dry and intact, no signs of infection/active bleeding/drainage    Medications: [Standing]  acetaminophen  IVPB .. 1000 milliGRAM(s) IV Intermittent once  acetaminophen  IVPB .. 1000 milliGRAM(s) IV Intermittent once  cefoTEtan  IVPB 2 Gram(s) IV Intermittent every 12 hours  heparin   Injectable 5000 Unit(s) SubCutaneous every 8 hours  HYDROmorphone  Injectable 0.25 milliGRAM(s) IV Push every 6 hours PRN  ketorolac   Injectable 15 milliGRAM(s) IV Push every 6 hours  lactated ringers. 1000 milliLiter(s) IV Continuous <Continuous>  ondansetron Injectable 4 milliGRAM(s) IV Push every 6 hours PRN  pantoprazole  Injectable 40 milliGRAM(s) IV Push daily    Medications: [PRN]  acetaminophen  IVPB .. 1000 milliGRAM(s) IV Intermittent once  acetaminophen  IVPB .. 1000 milliGRAM(s) IV Intermittent once  cefoTEtan  IVPB 2 Gram(s) IV Intermittent every 12 hours  heparin   Injectable 5000 Unit(s) SubCutaneous every 8 hours  HYDROmorphone  Injectable 0.25 milliGRAM(s) IV Push every 6 hours PRN  ketorolac   Injectable 15 milliGRAM(s) IV Push every 6 hours  lactated ringers. 1000 milliLiter(s) IV Continuous <Continuous>  ondansetron Injectable 4 milliGRAM(s) IV Push every 6 hours PRN  pantoprazole  Injectable 40 milliGRAM(s) IV Push daily    Labs:                        16.5   20.06 )-----------( 328      ( 08 Mar 2021 11:10 )             48.6     03-08    137  |  103  |  13  ----------------------------<  194<H>  4.6   |  23  |  1.3    Ca    9.1      08 Mar 2021 11:10  Phos  4.3     03-08  Mg     2.0     03-08      PT/INR - ( 08 Mar 2021 11:20 )   PT: 12.70 sec;   INR: 1.10 ratio         PTT - ( 08 Mar 2021 11:20 )  PTT:31.4 sec  CARDIAC MARKERS ( 08 Mar 2021 11:10 )  x     / <0.01 ng/mL / 633 U/L / x     / 6.1 ng/mL      Imaging:  No post-op imaging studies    Assessment:  35yMale patient S/P gastric sleeve    Plan:  Natan pathway  ambulate as tolerated  Monitor vitals  f/u HTN  SICU care      Date/Time: 03-08-21 @ 15:48
PACU ANESTHESIA ADMISSION NOTE      Procedure: Block, transversus abdominis plane, bilateral    Laparoscopic sleeve gastrectomy      Post op diagnosis:  Morbid obesity        ____  Intubated  TV:______       Rate: ______      FiO2: ______    ____  Patent Airway    ____  Full return of protective reflexes    ____  Full recovery from anesthesia / back to baseline status    Vitals:  temp(F) 96.8  /98  spo2 98  RR 18  pulse 68    Mental Status:  __x __ Awake   _____ Alert   _____ Drowsy   _____ Sedated    Nausea/Vomiting:  ____x  NO  ______Yes,   See Post - Op Orders          Pain Scale (0-10):  _____    Treatment: ___x _ None    ____ See Post - Op/PCA Orders    Post - Operative Fluids:   ____ Oral   _x ___ See Post - Op Orders    Plan: Discharge:   ____Home       _____Floor     __x ___Critical Care    _____  Other:_________________    Comments: uneventful anesthesia course no complications. Vitals  stable. Pt transferred to PACU. ICU resident given the sign out care transferred to ICU team

## 2021-03-08 NOTE — CONSULT NOTE ADULT - ASSESSMENT
ASSESSMENT:  35y Male ***    PLAN:   Neurologic:   Respiratory:   Cardiovascular:   Gastrointestinal/Nutrition:   Renal/Genitourinary:   Hematologic:   Infectious Disease:   Lines/Tubes:  Endocrine:   Disposition:      ASSESSMENT:  35y Male with a history of morbid obesity, SHAKIR post-op Day 1 transferred to PACU s/p Laparoscopic sleeve gastrectomy.     PLAN:   Neurologic:   control pain with Dilaudid, IV Tylenol, Toradol prn   PO Gabapentin starting tomorrow     Respiratory:   4L NC  wean O2  Encourage Incentive Spirometry     Cardiovascular:   patient hypertensive postop; hydralazine for BP control  First troponin negative    Gastrointestinal/Nutrition:   GI prophylaxis: pantoprazole  Zofran PRN for nausea  NPO; ice chips/sips of water  1000 LR, continuos     Renal/Genitourinary:   No trujillo; monitor UOP    Hematologic:   DVT prophylaxis; heparin subq     Infectious Disease:   cefotetan IVPB q12 h    Lines/Tubes:  Peripheral IV    Endocrine:   monitor FS    Disposition:   Monitor 24 hours in PACU then transfer to floor.    ASSESSMENT:  35y Male with a history of morbid obesity, SHAKIR post-op Day 1 transferred to PACU s/p Laparoscopic sleeve gastrectomy.     PLAN:   Neurologic:   control pain with Dilaudid, IV Tylenol, Toradol prn   PO Gabapentin starting tomorrow     Respiratory:   Daily AM CXR  4L NC  wean O2  Encourage Incentive Spirometry     Cardiovascular:   hypertensive immediately post op; hydralazine given per anesthesia  patient persistently hypertensive; Vasotec given x 2  trend cardiac enzymes; first set negative   monitor for tachycardia   maintain normotensive;   daily EKGs monitor QTc   EKG 3/8: sinus bradycardia QTc 414    Gastrointestinal/Nutrition:   GI prophylaxis: pantoprazole  Zofran/Compezine PRN for nausea  3/8: NPO w/ once ounce per hour water  3/9: plan to advance to bariatric clear liquids       Renal/Genitourinary:   No trujillo; monitor UOP  LR @100cc/hr   Monitor electrolytes; replete as needed   Na 137; K 4.6; Mag 2.0; Phos 4.3    Hematologic:   DVT prophylaxis; heparin subq   Monitor Hemoglobin   H&H 20/48    Infectious Disease:   Monitor for signs of infection  Monitor for leukocytosis  Postop white count 20  Postop prophylaxis: cefotetan IVPB q12 h      Lines/Tubes:  Peripheral IV    Endocrine:   monitor FS    Disposition:   Stepdown

## 2021-03-09 LAB
ANION GAP SERPL CALC-SCNC: 11 MMOL/L — SIGNIFICANT CHANGE UP (ref 7–14)
ANION GAP SERPL CALC-SCNC: 12 MMOL/L — SIGNIFICANT CHANGE UP (ref 7–14)
ANION GAP SERPL CALC-SCNC: 15 MMOL/L — HIGH (ref 7–14)
BUN SERPL-MCNC: 12 MG/DL — SIGNIFICANT CHANGE UP (ref 10–20)
CALCIUM SERPL-MCNC: 8.7 MG/DL — SIGNIFICANT CHANGE UP (ref 8.5–10.1)
CALCIUM SERPL-MCNC: 9.1 MG/DL — SIGNIFICANT CHANGE UP (ref 8.5–10.1)
CALCIUM SERPL-MCNC: 9.4 MG/DL — SIGNIFICANT CHANGE UP (ref 8.5–10.1)
CHLORIDE SERPL-SCNC: 100 MMOL/L — SIGNIFICANT CHANGE UP (ref 98–110)
CHLORIDE SERPL-SCNC: 101 MMOL/L — SIGNIFICANT CHANGE UP (ref 98–110)
CHLORIDE SERPL-SCNC: 98 MMOL/L — SIGNIFICANT CHANGE UP (ref 98–110)
CK MB CFR SERPL CALC: 6.1 NG/ML — SIGNIFICANT CHANGE UP (ref 0.6–6.3)
CK MB CFR SERPL CALC: 8 NG/ML — HIGH (ref 0.6–6.3)
CK SERPL-CCNC: 422 U/L — HIGH (ref 0–225)
CO2 SERPL-SCNC: 25 MMOL/L — SIGNIFICANT CHANGE UP (ref 17–32)
CO2 SERPL-SCNC: 26 MMOL/L — SIGNIFICANT CHANGE UP (ref 17–32)
CO2 SERPL-SCNC: 26 MMOL/L — SIGNIFICANT CHANGE UP (ref 17–32)
CREAT SERPL-MCNC: 1 MG/DL — SIGNIFICANT CHANGE UP (ref 0.7–1.5)
CREAT SERPL-MCNC: 1.1 MG/DL — SIGNIFICANT CHANGE UP (ref 0.7–1.5)
CREAT SERPL-MCNC: 1.2 MG/DL — SIGNIFICANT CHANGE UP (ref 0.7–1.5)
GLUCOSE BLDC GLUCOMTR-MCNC: 113 MG/DL — HIGH (ref 70–99)
GLUCOSE SERPL-MCNC: 128 MG/DL — HIGH (ref 70–99)
GLUCOSE SERPL-MCNC: 137 MG/DL — HIGH (ref 70–99)
GLUCOSE SERPL-MCNC: 152 MG/DL — HIGH (ref 70–99)
HCT VFR BLD CALC: 48.2 % — SIGNIFICANT CHANGE UP (ref 42–52)
HCT VFR BLD CALC: 49.1 % — SIGNIFICANT CHANGE UP (ref 42–52)
HGB BLD-MCNC: 16 G/DL — SIGNIFICANT CHANGE UP (ref 14–18)
HGB BLD-MCNC: 16.7 G/DL — SIGNIFICANT CHANGE UP (ref 14–18)
MAGNESIUM SERPL-MCNC: 1.9 MG/DL — SIGNIFICANT CHANGE UP (ref 1.8–2.4)
MAGNESIUM SERPL-MCNC: 2.1 MG/DL — SIGNIFICANT CHANGE UP (ref 1.8–2.4)
MAGNESIUM SERPL-MCNC: 2.2 MG/DL — SIGNIFICANT CHANGE UP (ref 1.8–2.4)
MCHC RBC-ENTMCNC: 28.1 PG — SIGNIFICANT CHANGE UP (ref 27–31)
MCHC RBC-ENTMCNC: 29.2 PG — SIGNIFICANT CHANGE UP (ref 27–31)
MCHC RBC-ENTMCNC: 32.6 G/DL — SIGNIFICANT CHANGE UP (ref 32–37)
MCHC RBC-ENTMCNC: 34.6 G/DL — SIGNIFICANT CHANGE UP (ref 32–37)
MCV RBC AUTO: 84.3 FL — SIGNIFICANT CHANGE UP (ref 80–94)
MCV RBC AUTO: 86.3 FL — SIGNIFICANT CHANGE UP (ref 80–94)
NRBC # BLD: 0 /100 WBCS — SIGNIFICANT CHANGE UP (ref 0–0)
NRBC # BLD: 0 /100 WBCS — SIGNIFICANT CHANGE UP (ref 0–0)
PHOSPHATE SERPL-MCNC: 2.5 MG/DL — SIGNIFICANT CHANGE UP (ref 2.1–4.9)
PHOSPHATE SERPL-MCNC: 2.6 MG/DL — SIGNIFICANT CHANGE UP (ref 2.1–4.9)
PHOSPHATE SERPL-MCNC: 3 MG/DL — SIGNIFICANT CHANGE UP (ref 2.1–4.9)
PLATELET # BLD AUTO: 310 K/UL — SIGNIFICANT CHANGE UP (ref 130–400)
PLATELET # BLD AUTO: 317 K/UL — SIGNIFICANT CHANGE UP (ref 130–400)
POTASSIUM SERPL-MCNC: 3.5 MMOL/L — SIGNIFICANT CHANGE UP (ref 3.5–5)
POTASSIUM SERPL-MCNC: 3.7 MMOL/L — SIGNIFICANT CHANGE UP (ref 3.5–5)
POTASSIUM SERPL-MCNC: 4.1 MMOL/L — SIGNIFICANT CHANGE UP (ref 3.5–5)
POTASSIUM SERPL-SCNC: 3.5 MMOL/L — SIGNIFICANT CHANGE UP (ref 3.5–5)
POTASSIUM SERPL-SCNC: 3.7 MMOL/L — SIGNIFICANT CHANGE UP (ref 3.5–5)
POTASSIUM SERPL-SCNC: 4.1 MMOL/L — SIGNIFICANT CHANGE UP (ref 3.5–5)
RBC # BLD: 5.69 M/UL — SIGNIFICANT CHANGE UP (ref 4.7–6.1)
RBC # BLD: 5.72 M/UL — SIGNIFICANT CHANGE UP (ref 4.7–6.1)
RBC # FLD: 13.2 % — SIGNIFICANT CHANGE UP (ref 11.5–14.5)
RBC # FLD: 13.2 % — SIGNIFICANT CHANGE UP (ref 11.5–14.5)
SODIUM SERPL-SCNC: 137 MMOL/L — SIGNIFICANT CHANGE UP (ref 135–146)
SODIUM SERPL-SCNC: 138 MMOL/L — SIGNIFICANT CHANGE UP (ref 135–146)
SODIUM SERPL-SCNC: 139 MMOL/L — SIGNIFICANT CHANGE UP (ref 135–146)
TROPONIN T SERPL-MCNC: <0.01 NG/ML — SIGNIFICANT CHANGE UP
TROPONIN T SERPL-MCNC: <0.01 NG/ML — SIGNIFICANT CHANGE UP
WBC # BLD: 19.47 K/UL — HIGH (ref 4.8–10.8)
WBC # BLD: 20.36 K/UL — HIGH (ref 4.8–10.8)
WBC # FLD AUTO: 19.47 K/UL — HIGH (ref 4.8–10.8)
WBC # FLD AUTO: 20.36 K/UL — HIGH (ref 4.8–10.8)

## 2021-03-09 PROCEDURE — 71045 X-RAY EXAM CHEST 1 VIEW: CPT | Mod: 26,77

## 2021-03-09 PROCEDURE — 93010 ELECTROCARDIOGRAM REPORT: CPT | Mod: 77

## 2021-03-09 PROCEDURE — 74177 CT ABD & PELVIS W/CONTRAST: CPT | Mod: 26

## 2021-03-09 PROCEDURE — 93010 ELECTROCARDIOGRAM REPORT: CPT

## 2021-03-09 PROCEDURE — 71045 X-RAY EXAM CHEST 1 VIEW: CPT | Mod: 26

## 2021-03-09 PROCEDURE — 71275 CT ANGIOGRAPHY CHEST: CPT | Mod: 26

## 2021-03-09 PROCEDURE — 99024 POSTOP FOLLOW-UP VISIT: CPT

## 2021-03-09 PROCEDURE — 99291 CRITICAL CARE FIRST HOUR: CPT | Mod: 24

## 2021-03-09 RX ORDER — POTASSIUM PHOSPHATE, MONOBASIC POTASSIUM PHOSPHATE, DIBASIC 236; 224 MG/ML; MG/ML
15 INJECTION, SOLUTION INTRAVENOUS ONCE
Refills: 0 | Status: COMPLETED | OUTPATIENT
Start: 2021-03-09 | End: 2021-03-09

## 2021-03-09 RX ORDER — LABETALOL HCL 100 MG
100 TABLET ORAL ONCE
Refills: 0 | Status: COMPLETED | OUTPATIENT
Start: 2021-03-09 | End: 2021-03-09

## 2021-03-09 RX ORDER — MAGNESIUM SULFATE 500 MG/ML
1 VIAL (ML) INJECTION ONCE
Refills: 0 | Status: COMPLETED | OUTPATIENT
Start: 2021-03-09 | End: 2021-03-09

## 2021-03-09 RX ORDER — LABETALOL HCL 100 MG
300 TABLET ORAL EVERY 8 HOURS
Refills: 0 | Status: DISCONTINUED | OUTPATIENT
Start: 2021-03-09 | End: 2021-03-09

## 2021-03-09 RX ORDER — POTASSIUM CHLORIDE 20 MEQ
20 PACKET (EA) ORAL
Refills: 0 | Status: DISCONTINUED | OUTPATIENT
Start: 2021-03-09 | End: 2021-03-09

## 2021-03-09 RX ORDER — LABETALOL HCL 100 MG
200 TABLET ORAL EVERY 8 HOURS
Refills: 0 | Status: DISCONTINUED | OUTPATIENT
Start: 2021-03-09 | End: 2021-03-09

## 2021-03-09 RX ORDER — IOHEXOL 300 MG/ML
100 INJECTION, SOLUTION INTRAVENOUS ONCE
Refills: 0 | Status: COMPLETED | OUTPATIENT
Start: 2021-03-09 | End: 2021-03-09

## 2021-03-09 RX ORDER — LABETALOL HCL 100 MG
400 TABLET ORAL EVERY 8 HOURS
Refills: 0 | Status: DISCONTINUED | OUTPATIENT
Start: 2021-03-10 | End: 2021-03-10

## 2021-03-09 RX ORDER — IOHEXOL 300 MG/ML
100 INJECTION, SOLUTION INTRAVENOUS ONCE
Refills: 0 | Status: DISCONTINUED | OUTPATIENT
Start: 2021-03-09 | End: 2021-03-09

## 2021-03-09 RX ORDER — INFLUENZA VIRUS VACCINE 15; 15; 15; 15 UG/.5ML; UG/.5ML; UG/.5ML; UG/.5ML
0.5 SUSPENSION INTRAMUSCULAR ONCE
Refills: 0 | Status: COMPLETED | OUTPATIENT
Start: 2021-03-09 | End: 2021-03-09

## 2021-03-09 RX ADMIN — Medication 2 MG/HR: at 03:00

## 2021-03-09 RX ADMIN — Medication 100 GRAM(S): at 02:07

## 2021-03-09 RX ADMIN — Medication 1000 MILLIGRAM(S): at 00:50

## 2021-03-09 RX ADMIN — ONDANSETRON 4 MILLIGRAM(S): 8 TABLET, FILM COATED ORAL at 18:09

## 2021-03-09 RX ADMIN — Medication 15 MILLIGRAM(S): at 00:21

## 2021-03-09 RX ADMIN — Medication 1000 MILLIGRAM(S): at 12:27

## 2021-03-09 RX ADMIN — GABAPENTIN 125 MILLIGRAM(S): 400 CAPSULE ORAL at 15:16

## 2021-03-09 RX ADMIN — Medication 20 MILLIEQUIVALENT(S): at 08:12

## 2021-03-09 RX ADMIN — Medication 1000 MILLIGRAM(S): at 05:58

## 2021-03-09 RX ADMIN — Medication 400 MILLIGRAM(S): at 00:20

## 2021-03-09 RX ADMIN — Medication 2 MG/HR: at 04:11

## 2021-03-09 RX ADMIN — PANTOPRAZOLE SODIUM 40 MILLIGRAM(S): 20 TABLET, DELAYED RELEASE ORAL at 11:57

## 2021-03-09 RX ADMIN — Medication 400 MILLIGRAM(S): at 11:57

## 2021-03-09 RX ADMIN — Medication 15 MILLIGRAM(S): at 23:00

## 2021-03-09 RX ADMIN — Medication 15 MILLIGRAM(S): at 11:57

## 2021-03-09 RX ADMIN — Medication 100 GRAM(S): at 05:19

## 2021-03-09 RX ADMIN — HEPARIN SODIUM 5000 UNIT(S): 5000 INJECTION INTRAVENOUS; SUBCUTANEOUS at 21:09

## 2021-03-09 RX ADMIN — Medication 100 MILLIGRAM(S): at 23:00

## 2021-03-09 RX ADMIN — Medication 2 MG/HR: at 09:24

## 2021-03-09 RX ADMIN — Medication 2 MG/HR: at 00:00

## 2021-03-09 RX ADMIN — HEPARIN SODIUM 5000 UNIT(S): 5000 INJECTION INTRAVENOUS; SUBCUTANEOUS at 05:06

## 2021-03-09 RX ADMIN — Medication 15 MILLIGRAM(S): at 06:25

## 2021-03-09 RX ADMIN — Medication 2 MG/HR: at 12:33

## 2021-03-09 RX ADMIN — Medication 300 MILLIGRAM(S): at 21:10

## 2021-03-09 RX ADMIN — ONDANSETRON 4 MILLIGRAM(S): 8 TABLET, FILM COATED ORAL at 05:01

## 2021-03-09 RX ADMIN — Medication 15 MILLIGRAM(S): at 17:30

## 2021-03-09 RX ADMIN — Medication 2 MG/HR: at 10:59

## 2021-03-09 RX ADMIN — Medication 15 MILLIGRAM(S): at 00:51

## 2021-03-09 RX ADMIN — Medication 300 MILLIGRAM(S): at 14:28

## 2021-03-09 RX ADMIN — Medication 2 MG/HR: at 17:04

## 2021-03-09 RX ADMIN — IOHEXOL 100 MILLILITER(S): 300 INJECTION, SOLUTION INTRAVENOUS at 16:33

## 2021-03-09 RX ADMIN — Medication 15 MILLIGRAM(S): at 12:27

## 2021-03-09 RX ADMIN — Medication 15 MILLIGRAM(S): at 17:05

## 2021-03-09 RX ADMIN — ONDANSETRON 4 MILLIGRAM(S): 8 TABLET, FILM COATED ORAL at 23:29

## 2021-03-09 RX ADMIN — HEPARIN SODIUM 5000 UNIT(S): 5000 INJECTION INTRAVENOUS; SUBCUTANEOUS at 14:26

## 2021-03-09 RX ADMIN — SODIUM CHLORIDE 100 MILLILITER(S): 9 INJECTION, SOLUTION INTRAVENOUS at 17:04

## 2021-03-09 RX ADMIN — Medication 400 MILLIGRAM(S): at 05:56

## 2021-03-09 RX ADMIN — GABAPENTIN 125 MILLIGRAM(S): 400 CAPSULE ORAL at 06:56

## 2021-03-09 RX ADMIN — Medication 15 MILLIGRAM(S): at 23:18

## 2021-03-09 RX ADMIN — Medication 15 MILLIGRAM(S): at 05:55

## 2021-03-09 RX ADMIN — GABAPENTIN 125 MILLIGRAM(S): 400 CAPSULE ORAL at 21:10

## 2021-03-09 RX ADMIN — Medication 200 MILLIGRAM(S): at 07:07

## 2021-03-09 RX ADMIN — ONDANSETRON 4 MILLIGRAM(S): 8 TABLET, FILM COATED ORAL at 11:57

## 2021-03-09 RX ADMIN — POTASSIUM PHOSPHATE, MONOBASIC POTASSIUM PHOSPHATE, DIBASIC 62.5 MILLIMOLE(S): 236; 224 INJECTION, SOLUTION INTRAVENOUS at 19:16

## 2021-03-09 RX ADMIN — Medication 2 MG/HR: at 08:12

## 2021-03-09 NOTE — PROGRESS NOTE ADULT - SUBJECTIVE AND OBJECTIVE BOX
DOLORES GAVIOTA  [id number]  35y Male    Indication for ICU admission: s/p lap sleeve gastrectomy    Admit Date:03-08-21  ICU Date: 3/8/21  OR Date: 3/8/21    No Known Allergies    PAST MEDICAL & SURGICAL HISTORY:  Bilateral knee pain    Lower back pain    Obstructive sleep apnea on CPAP    Obesity  BMI &gt;40    Status post fracture of left tibia  5/1999      Home Medications:        24HRS EVENT::  Night  Persistent HTN, A-line placed, clevidipine started  New tachycardia 120's from 60s  Mag x 1  CXR wnl  EKG: sinus tach, CE neg x 2, next       DVT PTX: Hep sq    GI PTX:pantoprazole  Injectable 40 milliGRAM(s) IV Push daily      ***Tubes/Lines/Drains  ***  Peripheral IV  Central Venous Line     	Date   Arterial Line		                Date   [] PICC:         	[] Midline		[] Mediport             Urinary Catheter		Indication: Strict I&O    Date Placed:       REVIEW OF SYSTEMS    [x ] A ten-point review of systems was otherwise negative except as noted.  [ ] Due to altered mental status/intubation, subjective information were not able to be obtained from the patient. History was obtained, to the extent possible, from review of the chart and collateral sources of information.

## 2021-03-09 NOTE — PROGRESS NOTE ADULT - SUBJECTIVE AND OBJECTIVE BOX
GENERAL SURGERY PROGRESS NOTE     GAVIOTA DOLORES  35y  Male  Hospital day :1d  POD:  Procedure: Block, transversus abdominis plane, bilateral    Laparoscopic sleeve gastrectomy      OVERNIGHT EVENTS: Pt was eduardo after surgery to 55-60 afterwards HTN to 180s and tachycardic to 120s. EKG showed sinus tachy    T(F): 98.8 (03-09-21 @ 08:00), Max: 98.8 (03-08-21 @ 20:00)  HR: 97 (03-09-21 @ 09:00) (56 - 119)  BP: 117/61 (03-09-21 @ 09:00) (116/62 - 211/139)  ABP: 128/64 (03-09-21 @ 09:00) (128/64 - 224/97)  ABP(mean): 81 (03-09-21 @ 09:00) (81 - 133)  RR: 14 (03-09-21 @ 09:00) (13 - 28)  SpO2: 95% (03-09-21 @ 09:00) (91% - 100%)    DIET/FLUIDS: lactated ringers. 1000 milliLiter(s) IV Continuous <Continuous>    NG:                                                                                DRAINS:     BM:     EMESIS:     URINE:      GI proph:  pantoprazole  Injectable 40 milliGRAM(s) IV Push daily    AC/ proph: heparin   Injectable 5000 Unit(s) SubCutaneous every 8 hours    ABx:     PHYSICAL EXAM:  GENERAL: NAD, well-appearing  CHEST/LUNG: Clear to auscultation bilaterally  HEART: Regular rate and rhythm  ABDOMEN: Soft, mildy tender, Nondistended; incision site clean, dry  EXTREMITIES:  No clubbing, cyanosis, or edema      LABS  Labs:  CAPILLARY BLOOD GLUCOSE      POCT Blood Glucose.: 179 mg/dL (08 Mar 2021 11:07)                          16.7   20.36 )-----------( 310      ( 09 Mar 2021 06:00 )             48.2         03-09    137  |  100  |  12  ----------------------------<  152<H>  3.5   |  26  |  1.0      Calcium, Total Serum: 9.1 mg/dL (03-09-21 @ 06:00)      LFTs:         Coags:     12.70  ----< 1.10    ( 08 Mar 2021 11:20 )     31.4        CARDIAC MARKERS ( 09 Mar 2021 08:09 )  x     / <0.01 ng/mL / 422 U/L / x     / 8.0 ng/mL  CARDIAC MARKERS ( 08 Mar 2021 23:20 )  x     / <0.01 ng/mL / x     / x     / 6.1 ng/mL  CARDIAC MARKERS ( 08 Mar 2021 11:10 )  x     / <0.01 ng/mL / 633 U/L / x     / 6.1 ng/mL                  RADIOLOGY & ADDITIONAL TESTS:         GENERAL SURGERY PROGRESS NOTE     GAVIOTA DOLORES  35y  Male  Hospital day :1d  POD:  Procedure: Block, transversus abdominis plane, bilateral    Laparoscopic sleeve gastrectomy      OVERNIGHT EVENTS: Pt was eduardo after surgery to 55-60 afterwards HTN to 180s and tachycardic to 120s. EKG showed sinus tachy    T(F): 98.8 (03-09-21 @ 08:00), Max: 98.8 (03-08-21 @ 20:00)  HR: 97 (03-09-21 @ 09:00) (56 - 119)  BP: 117/61 (03-09-21 @ 09:00) (116/62 - 211/139)  ABP: 128/64 (03-09-21 @ 09:00) (128/64 - 224/97)  ABP(mean): 81 (03-09-21 @ 09:00) (81 - 133)  RR: 14 (03-09-21 @ 09:00) (13 - 28)  SpO2: 95% (03-09-21 @ 09:00) (91% - 100%)    DIET/FLUIDS: lactated ringers. 1000 milliLiter(s) IV Continuous <Continuous>     GI proph:  pantoprazole  Injectable 40 milliGRAM(s) IV Push daily    AC/ proph: heparin   Injectable 5000 Unit(s) SubCutaneous every 8 hours    ABx:   Antimicrobial/Immunologic Medications  cefoTEtan  IVPB 2 Gram(s) IV Intermittent every 12 hours      Topical/Other Medications    PHYSICAL EXAM:  GENERAL: NAD, well-appearing  CHEST/LUNG: Clear to auscultation bilaterally  HEART: Regular rate and rhythm  ABDOMEN: Soft, mildy tender, Nondistended; incision site clean, dry  EXTREMITIES:  No clubbing, cyanosis, or edema      LABS  CAPILLARY BLOOD GLUCOSE      POCT Blood Glucose.: 179 mg/dL (08 Mar 2021 11:07)                          16.7   20.36 )-----------( 310      ( 09 Mar 2021 06:00 )             48.2         03-09    137  |  100  |  12  ----------------------------<  152<H>  3.5   |  26  |  1.0      Calcium, Total Serum: 9.1 mg/dL (03-09-21 @ 06:00)         12.70  ----< 1.10    ( 08 Mar 2021 11:20 )     31.4        CARDIAC MARKERS ( 09 Mar 2021 08:09 )  x     / <0.01 ng/mL / 422 U/L / x     / 8.0 ng/mL  CARDIAC MARKERS ( 08 Mar 2021 23:20 )  x     / <0.01 ng/mL / x     / x     / 6.1 ng/mL  CARDIAC MARKERS ( 08 Mar 2021 11:10 )  x     / <0.01 ng/mL / 633 U/L / x     / 6.1 ng/mL        RADIOLOGY & ADDITIONAL TESTS:      EXAM:  XR CHEST PORTABLE ROUTINE 1V            PROCEDURE DATE:  03/09/2021            INTERPRETATION:  Clinical History/Reason for Exam:  sicu    Comparison: XR CHEST 3/9/2021 1:33 AM.      Findings:    Technique/Positioning:  Frontal portable radiograph of the chest. Monitoring leads overlie lung fields, obscuring underlying anatomy.    Support devices:  none    Cardiac/mediastinum/hilum: Low lung volumes    Lung parenchyma/ Pleura: No focal parenchymal opacities, effusion or pneumothorax is present.      Skeleton/soft tissues: No focal skeletal lesions are identified.      Impression: Low lung volumes    No consolidation, effusion or pneumothorax.

## 2021-03-09 NOTE — PROGRESS NOTE ADULT - ASSESSMENT
A/P    monitor vitals  monitor bowel function  possible jair fluids today  labs  repletions  SICU care Assessment: 35M PMHx MO (BMI 43), SHAKIR on CPAP s/p lap sleeve 3/8, course c/b HTN and tachycardia     Plan:    Neurologic:   control pain with Dilaudid, IV Tylenol, Toradol prn   PO Gabapentin starting today    Respiratory:   Daily AM CXR  Weaned off O2 --> Now on RA   Encourage Incentive Spirometry     Cardiovascular:   hypertensive immediately post op; hydralazine given per anesthesia  patient persistently hypertensive; Vasotec given x 2, ultimately requiring cleviprex gtt   C/w cleviprex; add labetalol 300 q8h   cardiac enzymes; neg x 3  new tachycardia (3/8 ovn), sinus,   maintain normotensive; clevidipine gtt and labetalol PO for normotension   daily EKGs monitor QTc   EKG 3/9: NSR QTc 459    Gastrointestinal/Nutrition:   GI prophylaxis: pantoprazole  Zofran/Compezine PRN for nausea  Natan CLD    Renal/Genitourinary:   No trujillo; monitor UOP  LR @100cc/hr   Monitor electrolytes; replete as needed   Na  137 K 3.5 Mag 2.1 phosph 2.5    Hematologic:   DVT prophylaxis; heparin subq   Monitor Hemoglobin   H&H 20/48>>16.49.1 --> 16.7/48.2    Infectious Disease:   Monitor for signs of infection, leak??  Monitor for leukocytosis  WBC 19-->20.36  Postop prophylaxis: cefotetan IVPB q12 h      Lines/Tubes:  Peripheral IV    Endocrine:     Disposition:   SICU    Assessment: 35M PMHx MO (BMI 43), SHAKIR on CPAP s/p lap sleeve 3/8, course c/b HTN and tachycardia     Plan:    Neurologic:   control pain with Dilaudid, IV Tylenol, Toradol prn   PO Gabapentin starting today    Respiratory:   Daily AM CXR  Weaned off O2 --> Now on RA   Encourage Incentive Spirometry     Cardiovascular:   hypertensive immediately post op; hydralazine given per anesthesia  patient persistently hypertensive; Vasotec given x 2, ultimately requiring cleviprex gtt   C/w cleviprex; add labetalol 300 q8h   cardiac enzymes; neg x 3  new tachycardia (3/8 ovn), sinus,   maintain normotensive; clevidipine gtt and labetalol PO for normotension   daily EKGs monitor QTc   EKG 3/9: NSR QTc 459  follow up CT for persistent tachycardia     Gastrointestinal/Nutrition:   GI prophylaxis: pantoprazole  Zofran/Compezine PRN for nausea  Natan CLD    Renal/Genitourinary:   No trujillo; monitor UOP  LR @100cc/hr   Monitor electrolytes; replete as needed   Na  137 K 3.5 Mag 2.1 phosph 2.5    Hematologic:   DVT prophylaxis; heparin subq   Monitor Hemoglobin   H&H 20/48>>16.49.1 --> 16.7/48.2    Infectious Disease:   Monitor for signs of infection, leak??  Monitor for leukocytosis  WBC 19-->20.36  Postop prophylaxis: cefotetan IVPB q12 h      Lines/Tubes:  Peripheral IV    Endocrine:     Disposition:   SICU

## 2021-03-09 NOTE — PROGRESS NOTE ADULT - ASSESSMENT
Assessment: 35M PMHx MO (BMI 43), SHAKIR on CPAP s/p lap sleeve 3/8, course c/b HTN and tachycardia   Plan:    Neurologic:   control pain with Dilaudid, IV Tylenol, Toradol prn   PO Gabapentin starting today    Respiratory:   Daily AM CXR  Weaned off O2 --> Now on RA   Encourage Incentive Spirometry     Cardiovascular:   hypertensive immediately post op; hydralazine given per anesthesia  patient persistently hypertensive; Vasotec given x 2, ultimately requiring cleviprex gtt   trend cardiac enzymes; neg x 2  new tachycardia (3/8 ovn), sinus,   maintain normotensive; Vasotec 2.5 Q6h  daily EKGs monitor QTc   EKG 3/8: sinus bradycardia QTc 414    Gastrointestinal/Nutrition:   GI prophylaxis: pantoprazole  Zofran/Compezine PRN for nausea  Natan CLD    Renal/Genitourinary:   No trujillo; monitor UOP  LR @100cc/hr   Monitor electrolytes; replete as needed   Na 139 K 4.1 Mag 1.9 phosph 3.0    Hematologic:   DVT prophylaxis; heparin subq   Monitor Hemoglobin   H&H 20/48>>16.49.1    Infectious Disease:   Monitor for signs of infection, leak??  Monitor for leukocytosis  Postop white count 20  Postop prophylaxis: cefotetan IVPB q12 h      Lines/Tubes:  Peripheral IV    Endocrine:   monitor FS    Disposition:   Transfer to SICU    Assessment: 35M PMHx MO (BMI 43), SHAKIR on CPAP s/p lap sleeve 3/8, course c/b HTN and tachycardia   Plan:    Neurologic:   control pain with Dilaudid, IV Tylenol, Toradol prn   PO Gabapentin starting today    Respiratory:   Daily AM CXR  Weaned off O2 --> Now on RA   Encourage Incentive Spirometry     Cardiovascular:   hypertensive immediately post op; hydralazine given per anesthesia  patient persistently hypertensive; Vasotec given x 2, ultimately requiring cleviprex gtt   trend cardiac enzymes; neg x 2  new tachycardia (3/8 ovn), sinus,   maintain normotensive; clevidipine gtt   daily EKGs monitor QTc   EKG 3/8: sinus bradycardia QTc 414    Gastrointestinal/Nutrition:   GI prophylaxis: pantoprazole  Zofran/Compezine PRN for nausea  Natan CLD    Renal/Genitourinary:   No trujillo; monitor UOP  LR @100cc/hr   Monitor electrolytes; replete as needed   Na 139 K 4.1 Mag 1.9 phosph 3.0    Hematologic:   DVT prophylaxis; heparin subq   Monitor Hemoglobin   H&H 20/48>>16.49.1    Infectious Disease:   Monitor for signs of infection, leak??  Monitor for leukocytosis  Postop white count 20  Postop prophylaxis: cefotetan IVPB q12 h      Lines/Tubes:  Peripheral IV    Endocrine:   monitor FS    Disposition:   Transfer to SICU

## 2021-03-10 ENCOUNTER — TRANSCRIPTION ENCOUNTER (OUTPATIENT)
Age: 36
End: 2021-03-10

## 2021-03-10 VITALS — HEART RATE: 75 BPM | OXYGEN SATURATION: 97 %

## 2021-03-10 LAB
ANION GAP SERPL CALC-SCNC: 12 MMOL/L — SIGNIFICANT CHANGE UP (ref 7–14)
BUN SERPL-MCNC: 13 MG/DL — SIGNIFICANT CHANGE UP (ref 10–20)
CALCIUM SERPL-MCNC: 8.9 MG/DL — SIGNIFICANT CHANGE UP (ref 8.5–10.1)
CHLORIDE SERPL-SCNC: 103 MMOL/L — SIGNIFICANT CHANGE UP (ref 98–110)
CO2 SERPL-SCNC: 25 MMOL/L — SIGNIFICANT CHANGE UP (ref 17–32)
CREAT SERPL-MCNC: 1.1 MG/DL — SIGNIFICANT CHANGE UP (ref 0.7–1.5)
GLUCOSE SERPL-MCNC: 118 MG/DL — HIGH (ref 70–99)
HCT VFR BLD CALC: 43.8 % — SIGNIFICANT CHANGE UP (ref 42–52)
HGB BLD-MCNC: 15 G/DL — SIGNIFICANT CHANGE UP (ref 14–18)
MAGNESIUM SERPL-MCNC: 2.2 MG/DL — SIGNIFICANT CHANGE UP (ref 1.8–2.4)
MCHC RBC-ENTMCNC: 29.4 PG — SIGNIFICANT CHANGE UP (ref 27–31)
MCHC RBC-ENTMCNC: 34.2 G/DL — SIGNIFICANT CHANGE UP (ref 32–37)
MCV RBC AUTO: 85.7 FL — SIGNIFICANT CHANGE UP (ref 80–94)
NRBC # BLD: 0 /100 WBCS — SIGNIFICANT CHANGE UP (ref 0–0)
PHOSPHATE SERPL-MCNC: 3.5 MG/DL — SIGNIFICANT CHANGE UP (ref 2.1–4.9)
PLATELET # BLD AUTO: 296 K/UL — SIGNIFICANT CHANGE UP (ref 130–400)
POTASSIUM SERPL-MCNC: 4 MMOL/L — SIGNIFICANT CHANGE UP (ref 3.5–5)
POTASSIUM SERPL-SCNC: 4 MMOL/L — SIGNIFICANT CHANGE UP (ref 3.5–5)
RBC # BLD: 5.11 M/UL — SIGNIFICANT CHANGE UP (ref 4.7–6.1)
RBC # FLD: 13.4 % — SIGNIFICANT CHANGE UP (ref 11.5–14.5)
SODIUM SERPL-SCNC: 140 MMOL/L — SIGNIFICANT CHANGE UP (ref 135–146)
SURGICAL PATHOLOGY STUDY: SIGNIFICANT CHANGE UP
WBC # BLD: 14.42 K/UL — HIGH (ref 4.8–10.8)
WBC # FLD AUTO: 14.42 K/UL — HIGH (ref 4.8–10.8)

## 2021-03-10 PROCEDURE — 99233 SBSQ HOSP IP/OBS HIGH 50: CPT | Mod: 24

## 2021-03-10 PROCEDURE — 71045 X-RAY EXAM CHEST 1 VIEW: CPT | Mod: 26

## 2021-03-10 PROCEDURE — 99024 POSTOP FOLLOW-UP VISIT: CPT

## 2021-03-10 RX ORDER — LABETALOL HCL 100 MG
2 TABLET ORAL
Qty: 180 | Refills: 0
Start: 2021-03-10 | End: 2021-04-08

## 2021-03-10 RX ORDER — GABAPENTIN 400 MG/1
2.5 CAPSULE ORAL
Qty: 0 | Refills: 0 | DISCHARGE
Start: 2021-03-10

## 2021-03-10 RX ADMIN — HEPARIN SODIUM 5000 UNIT(S): 5000 INJECTION INTRAVENOUS; SUBCUTANEOUS at 05:04

## 2021-03-10 RX ADMIN — Medication 400 MILLIGRAM(S): at 05:03

## 2021-03-10 RX ADMIN — PANTOPRAZOLE SODIUM 40 MILLIGRAM(S): 20 TABLET, DELAYED RELEASE ORAL at 12:59

## 2021-03-10 RX ADMIN — GABAPENTIN 125 MILLIGRAM(S): 400 CAPSULE ORAL at 05:04

## 2021-03-10 RX ADMIN — ONDANSETRON 4 MILLIGRAM(S): 8 TABLET, FILM COATED ORAL at 07:09

## 2021-03-10 RX ADMIN — Medication 400 MILLIGRAM(S): at 13:00

## 2021-03-10 NOTE — PROGRESS NOTE ADULT - ASSESSMENT
Assessment:  35y Male patient admitted S/P lap sleeve. Had HTN and Tachycardia after procedure.   Off cleviprex. BP controlled Labetolol 400 q8 PO. HR normal all night. tolerating jair diet.  Patient seen and examined at bedside. NAD.     Plan:  - Discharge today  - Send home with Labetolol  - F/u with PCP as well as Dr. Roque      Date/Time: 03-10-21 @ 08:33

## 2021-03-10 NOTE — PROGRESS NOTE ADULT - SUBJECTIVE AND OBJECTIVE BOX
Progress Note: General Surgery  Patient: DOLORES MEEK , 35y (1985)Male   MRN: [id number]  [facility] [place]  Visit: 03-08-21 Inpatient  Date: 03-10-21 @ 08:33    Admit Diagnosis/Chief Complaint: Morbid obesity    Procedure/Diagnosis: Morbid obesity     S/P Block, transversus abdominis plane, bilateral    Laparoscopic sleeve gastrectomy     POD# 2    Events/ 24h: Off cleviprex. BP controlled Labetolol 400 q8 PO. HR normal all night. tolerating jair diet.    Vitals: T(F): 98.3 (03-10-21 @ 04:00), Max: 99.3 (03-09-21 @ 15:28)  HR: 68 (03-10-21 @ 08:00)  BP: 140/59 (03-09-21 @ 13:17) (116/62 - 140/59)  RR: 16 (03-10-21 @ 08:00)  SpO2: 97% (03-10-21 @ 08:00)    In:   03-09-21 @ 07:01  -  03-10-21 @ 07:00  --------------------------------------------------------  IN: 3557 mL      Out:   03-09-21 @ 07:01  -  03-10-21 @ 07:00  --------------------------------------------------------  OUT:    Voided (mL): 3405 mL  Total OUT: 3405 mL        Net:   03-09-21 @ 07:01  -  03-10-21 @ 07:00  --------------------------------------------------------  NET: 152 mL        Diet: Diet, Clear Liquid:   Bariatric Clear Liquid (BARICLLIQ) (03-08-21 @ 16:20)    IV Fluids:     Physical Examination:  General Appearance: NAD  HEENT: EOMI, sclera non-icteric.  Heart: RRR   Lungs: CTABL.   Abdomen:  Soft, nontender, nondistended.   MSK/Extremities: Warm & well-perfused.   Skin: Warm, dry. No jaundice.       Medications: [Standing]  gabapentin   Solution 125 milliGRAM(s) Oral three times a day  heparin   Injectable 5000 Unit(s) SubCutaneous every 8 hours  labetalol 400 milliGRAM(s) Oral every 8 hours  pantoprazole  Injectable 40 milliGRAM(s) IV Push daily    DVT Prophylaxis: heparin   Injectable 5000 Unit(s) SubCutaneous every 8 hours    GI Prophylaxis: pantoprazole  Injectable 40 milliGRAM(s) IV Push daily    Antibiotics:   Anticoagulation:   Medications:[PRN]  HYDROmorphone  Injectable 0.25 milliGRAM(s) IV Push every 6 hours PRN      Labs:                        15.0   14.42 )-----------( 296      ( 10 Mar 2021 00:04 )             43.8     03-10    140  |  103  |  13  ----------------------------<  118<H>  4.0   |  25  |  1.1    Ca    8.9      10 Mar 2021 00:04  Phos  3.5     03-10  Mg     2.2     03-10        PT/INR - ( 08 Mar 2021 11:20 )   PT: 12.70 sec;   INR: 1.10 ratio         PTT - ( 08 Mar 2021 11:20 )  PTT:31.4 sec    CARDIAC MARKERS ( 09 Mar 2021 08:09 )  x     / <0.01 ng/mL / 422 U/L / x     / 8.0 ng/mL  CARDIAC MARKERS ( 08 Mar 2021 23:20 )  x     / <0.01 ng/mL / x     / x     / 6.1 ng/mL  CARDIAC MARKERS ( 08 Mar 2021 11:10 )  x     / <0.01 ng/mL / 633 U/L / x     / 6.1 ng/mL        Urine/Micro:        Imaging:   CT Abdomen and Pelvis w/ Oral Cont and w/ IV Cont:   EXAM:  CT ABDOMEN AND PELVIS OC IC        EXAM:  CT ANGIO CHEST PE PROTOCOL IC            PROCEDURE DATE:  03/09/2021            INTERPRETATION:  CLINICAL STATEMENT: Tachycardia. Status post sleeve gastrectomy. Evaluation for pulmonary embolism.    TECHNIQUE: Multislice helical sections were obtained from the thoracic inlet to the sacrum during rapid administration of 100 cc Isovue-370 intravenous contrast using a CTA protocol. Thin sections were reconstructed through the pulmonary vasculature.The abdomen and pelvis was imaged separately with oral contrast and residual contrast from CTA chest.    COMPARISON: None.    FINDINGS:    CHEST:    PULMONARY EMBOLUS: No evidence of central or segmental pulmonary emboli.    LUNGS/PLEURA/AIRWAYS: Scattered predominantly bibasilar linear and subsegmental atelectasis. No lower consolidations, pleural effusions or pneumothorax. Patent central airways.    HEART/GREAT VESSELS: The heart is normal in size. Aorta and main pulmonary artery are normal in caliber.    MEDIASTINUM/THORACIC NODES: Few prominent mediastinal lymph nodes with the largest measuring up to 1.3 cm in the short axis in the right paratracheal region (series 4 image 46).    ABDOMEN/PELVIS:    HEPATOBILIARY: Unremarkable.    SPLEEN:Unremarkable.    PANCREAS: Unremarkable.    ADRENAL GLANDS: Unremarkable.    KIDNEYS: Residual contrast in the bilateral collecting systems.  No hydronephrosis.    ABDOMINOPELVIC NODES: No lymphadenopathy.    PELVIC ORGANS: Small bilateral fat-containing inguinal hernias. Otherwise, unremarkable.    PERITONEUM/MESENTERY/BOWEL: Post sleeve gastrectomy with opacification of the remnant stomach and proximal small bowel loops. No evidence of contrast extravasation. No evidence of bowel obstruction, ascites or intraperitoneal free air. Unremarkable appendix.    BONES/SOFT TISSUES: Mild multilevel degenerative changes of the spine. 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 Degenerative changes of the thoracolumbar spine noted. Postsurgical subcutaneous fat stranding and few foci of gas in the anterior abdominal wall.    IMPRESSION:  1.  No acute pulmonary embolus.  2.  No CT evidence of an acute intrathoracic or abdominopelvic pathology.  3.  Post sleeve gastrectomy without CT evidence of complications.      PRIYA ANGELO M.D., RESIDENT RADIOLOGIST  This document has been electronically signed.  MARY SHEFFIELD MD; Attending Radiologist  This document has been electronically signed. Mar  9 2021  7:52PM (03-09-21 @ 18:48)

## 2021-03-10 NOTE — PROGRESS NOTE ADULT - ASSESSMENT
Assessment: 35M PMHx MO (BMI 43), SHAKIR on CPAP s/p lap sleeve 3/8, course c/b HTN and tachycardia   Plan:  Neurologic:   Control pain with Dilaudid, IV Tylenol, Toradol prn   PO Gabapentin started 3/9    Respiratory:   Daily AM CXR  Saturating well on room air  Encourage Incentive Spirometry   Monitor oxygen saturation     Cardiovascular:   Acute post op tachycardia to 120s, now improving to low 100s -- PE ruled out  Acute HTN; 400 Q8hr labetalol, cleviprex gtt weaned  3/9: CT PE and CT ab/pelvis: negative for intrabdominal pathology and PE   Cardiac enzymes; neg x 3  EKG 3/9: qtc 459 , sinus tachycardia     Gastrointestinal/Nutrition:   Diet: bariatric clear liquid diet  GI prophylaxis: pantoprazole IV  Zofran/Compezine PRN for nausea  Monitor QTC if antiemetics needed    Renal/Genitourinary:   Voiding  Monitor urine output  IVL  BUN/Cr: 13/1.1  Monitor electrolytes; replete as needed   Lytes: Na 140/ K 4.0/ Mag 2.2/ Phos 3.5    Hematologic:   DVT prophylaxis; heparin subq   Monitor Hemoglobin   H&H 20/48>>16.49.1 >>15     Infectious Disease:   Trend WBC: 19 --> 20 > 14  Postop prophylaxis: cefotetan IVPB q12 h completed  Monitor temperature trend    Lines/Tubes:  Peripheral IV  left radial arterial line    Endocrine:   Monitor FS    Disposition:   SICU, possible downgrade today if no longer requiring cleviprex

## 2021-03-10 NOTE — DISCHARGE NOTE PROVIDER - CARE PROVIDER_API CALL
Clover Roque)  [facility]  [address] [place] [address], [address] [place] [address]  Phone: [phone]  Fax: [phone]  Follow Up Time:

## 2021-03-10 NOTE — PROGRESS NOTE ADULT - SUBJECTIVE AND OBJECTIVE BOX
DOLORES GAVIOTA  [id number]  35y Male    Indication for ICU admission: s/p lap sleeve, tachycardia and HTN post op  Admit Date:03-08-21  ICU Date: 3/8  OR Date: 3/8    No Known Allergies    PAST MEDICAL & SURGICAL HISTORY:  Bilateral knee pain    Lower back pain    Obstructive sleep apnea on CPAP    Obesity  BMI &gt;40    Status post fracture of left tibia  5/1999      Home Medications:      24HRS EVENT:  DAY  -ok for PO antiHTNs  -wean cleviprex  -inc labetalol 300 q8hr  -follow up CT for persistent tachycardia   -CTPE protocol --> negative  -CT ab/pelvis PO contrast --> negative    3/9  NIGHT  titrated labetalol to 400q8  off clev gtt    DVT PTX: sqh    GI PTX:pantoprazole  Injectable 40 milliGRAM(s) IV Push daily    ***Tubes/Lines/Drains  ***  Peripheral IV    REVIEW OF SYSTEMS    [x ] A ten-point review of systems was otherwise negative except as noted.  [ ] Due to altered mental status/intubation, subjective information were not able to be obtained from the patient. History was obtained, to the extent possible, from review of the chart and collateral sources of information.       DOLORES GAVIOTA  [id number]  35y Male    Indication for ICU admission: s/p lap sleeve, tachycardia and HTN post op  Admit Date:03-08-21  ICU Date: 3/8  OR Date: 3/8    No Known Allergies    PAST MEDICAL & SURGICAL HISTORY:  Bilateral knee pain    Lower back pain    Obstructive sleep apnea on CPAP    Obesity  BMI &gt;40    Status post fracture of left tibia  5/1999      Home Medications:      24HRS EVENT:  DAY  -ok for PO antiHTNs  -wean cleviprex  -inc labetalol 300 q8hr  -follow up CT for persistent tachycardia   -CTPE protocol --> negative  -CT ab/pelvis PO contrast --> negative    3/9  NIGHT  titrated labetalol to 400q8  off clev gtt    DVT PTX: sqh    GI PTX:pantoprazole  Injectable 40 milliGRAM(s) IV Push daily    ***Tubes/Lines/Drains  ***  Peripheral IV    REVIEW OF SYSTEMS    [x ] A ten-point review of systems was otherwise negative except as noted.  [ ] Due to altered mental status/intubation, subjective information were not able to be obtained from the patient. History was obtained, to the extent possible, from review of the chart and collateral sources of information.      Daily     Daily     Diet, Clear Liquid:   Bariatric Clear Liquid (BARICLLIQ) (03-08-21 @ 16:20)      CURRENT MEDS:  Neurologic Medications  gabapentin   Solution 125 milliGRAM(s) Oral three times a day  HYDROmorphone  Injectable 0.25 milliGRAM(s) IV Push every 6 hours PRN Severe Pain (7 - 10)    Respiratory Medications    Cardiovascular Medications  labetalol 400 milliGRAM(s) Oral every 8 hours    Gastrointestinal Medications  pantoprazole  Injectable 40 milliGRAM(s) IV Push daily    Genitourinary Medications    Hematologic/Oncologic Medications  heparin   Injectable 5000 Unit(s) SubCutaneous every 8 hours    Antimicrobial/Immunologic Medications    Endocrine/Metabolic Medications    Topical/Other Medications      ICU Vital Signs Last 24 Hrs  T(C): 36.8 (10 Mar 2021 04:00), Max: 37.4 (09 Mar 2021 15:28)  T(F): 98.3 (10 Mar 2021 04:00), Max: 99.3 (09 Mar 2021 15:28)  HR: 68 (10 Mar 2021 08:00) (68 - 111)  BP: 140/59 (09 Mar 2021 13:17) (123/66 - 140/59)  BP(mean): 85 (09 Mar 2021 13:17) (85 - 90)  ABP: 143/94 (10 Mar 2021 08:00) (108/92 - 176/108)  ABP(mean): 118 (10 Mar 2021 08:00) (84 - 126)  RR: 16 (10 Mar 2021 08:00) (16 - 20)  SpO2: 97% (10 Mar 2021 08:00) (72% - 99%)      Adult Advanced Hemodynamics Last 24 Hrs  CVP(mm Hg): --  CVP(cm H2O): --  CO: --  CI: --  PA: --  PA(mean): --  PCWP: --  SVR: --  SVRI: --  PVR: --  PVRI: --          I&O's Summary    09 Mar 2021 07:01  -  10 Mar 2021 07:00  --------------------------------------------------------  IN: 3557 mL / OUT: 3405 mL / NET: 152 mL      I&O's Detail    09 Mar 2021 07:01  -  10 Mar 2021 07:00  --------------------------------------------------------  IN:    Clevidipine: 287 mL    IV PiggyBack: 100 mL    Lactated Ringers: 2400 mL    Oral Fluid: 770 mL  Total IN: 3557 mL    OUT:    Voided (mL): 3405 mL  Total OUT: 3405 mL    Total NET: 152 mL          PHYSICAL EXAM:    General/Neuro  Deficits:                             alert & oriented x 3, no focal deficits    Lungs:      clear to auscultation, Normal expansion/effort.     Cardiovascular : S1, S2.  Regular rate and rhythm.   Cardiac Rhythm: Normal Sinus Rhythm    GI: Abdomen soft, Non-tender, Non-distended.      Extremities: Extremities warm, pink, well-perfused.   Derm: Good skin turgor, no skin breakdown.      :       voiding      CXR:     LABS:  CAPILLARY BLOOD GLUCOSE      POCT Blood Glucose.: 113 mg/dL (09 Mar 2021 17:41)                          15.0   14.42 )-----------( 296      ( 10 Mar 2021 00:04 )             43.8       03-10    140  |  103  |  13  ----------------------------<  118<H>  4.0   |  25  |  1.1    Ca    8.9      10 Mar 2021 00:04  Phos  3.5     03-10  Mg     2.2     03-10        PT/INR - ( 08 Mar 2021 11:20 )   PT: 12.70 sec;   INR: 1.10 ratio         PTT - ( 08 Mar 2021 11:20 )  PTT:31.4 sec  CARDIAC MARKERS ( 09 Mar 2021 08:09 )  x     / <0.01 ng/mL / 422 U/L / x     / 8.0 ng/mL  CARDIAC MARKERS ( 08 Mar 2021 23:20 )  x     / <0.01 ng/mL / x     / x     / 6.1 ng/mL  CARDIAC MARKERS ( 08 Mar 2021 11:10 )  x     / <0.01 ng/mL / 633 U/L / x     / 6.1 ng/mL

## 2021-03-10 NOTE — DISCHARGE NOTE NURSING/CASE MANAGEMENT/SOCIAL WORK - PATIENT PORTAL LINK FT
You can access the FollowMyHealth Patient Portal offered by [facility] by registering at the following website: [email or web address]. By joining [employer]’s FollowMyHealth portal, you will also be able to view your health information using other applications (apps) compatible with our system.

## 2021-03-10 NOTE — DISCHARGE NOTE PROVIDER - HOSPITAL COURSE
Pt presented for scheduled elective laparoscopic sleeve gastrectomy. Post op pt developed persistent HTN and tachycardia; he was started on Cleviprex and transitioned to Labetolol.  His BP improved and tachycardia resolved. His diet was advanced and he tolerated 4 oz/hr before discharge. On the day of d/c the pt was afebrile, VSS, tolerating appropriate diet, voiding, having bowel function, and ambulating. the Pt is stable for d/c home with PCP f/u for HTN and surgical f/u as scheduled.

## 2021-03-10 NOTE — PROGRESS NOTE ADULT - ATTENDING COMMENTS
Patient called to move 9/15 appt up to [facility], stated she is having psychological issues with taking Tamoxifen and needs to come in. Informed patient she would need to talk to nurse first to determine how urgent it was.  Patient stated she was calling from work and could not talk about it and hung up on me.  
36yo male with morbid obesity POD#1 s/p laparoscopic sleeve gastrectomy. Reported occasional nausea and vomited once over night. Pain is moderated and controlled. Was tachycardic to 120's and hypertensive to 200's over night, started on Cleviprex drip. Labs reviewed - WBC 20. Tolerating bariatric clear liquid diet. Ambulating well. Using incentive spirometer. Continue DVT ppx with heparin, incentive spirometry, and GI ppx. Consider CT chest/A/P with po contrast if tachycardia persists. Possible discharge home tomorrow if BP and HR improve. Primary care as per ICU.
36yo female with morbid obesity POD#2 s/p laparoscopic sleeve gastrectomy. Reported occasional abdominal discomfort but minimal. Nausea has resolved. Tachycardia has resolved. Patient is no longer on Cleviprex drip, started on PO Labetalol. CT chest/A/P with PO contrast was done over night and unremarkable. Continues to tolerate CLD. Ambulating well. Using incentive spirometer. Continue DVT ppx with heparin, incentive spirometry, and GI ppx. Possible discharge home today.
CT chest, abdomen - no abnormalities.  Patient is comfortable today.  Tolerates po diet  BP controlled    Advance diet as per protocol  OOB  IS  GI and DVT prophylaxis
Patient has uncontrolled HTN, required Cleviprex drip over night.  Still on 14 mg iv/h this am.  Had sinus tachycardia that started last night - primary team notified.  Pain is better controlled this am.    ASSESSMENT:  36 y/o male with Morbid obesity.  S/P Lap. Sleeve gastrectomy.  SHAKIR.  Acute postoperative pain.  Hypertensive urgency    PLAN:  - continue pain control as needed  - incentive spirometer  - wean Cleviprex as tolerated; on po Labetalol  - advance diet as per protocol  - follow serum electrolytes and UOP  - GI and DVT prophylaxis

## 2021-03-10 NOTE — DISCHARGE NOTE PROVIDER - NSDCFUADDINST_GEN_ALL_CORE_FT
Please follow up with your primary care provider within 1 week for your high blood pressure and continuation of new blood pressure medication.

## 2021-03-10 NOTE — DISCHARGE NOTE PROVIDER - NSDCCPCAREPLAN_GEN_ALL_CORE_FT
PRINCIPAL DISCHARGE DIAGNOSIS  Diagnosis: Morbid obesity  Assessment and Plan of Treatment: Take meds according to previously prescribed plan. Advance diet as instructed per bariatric protocol. Please follow up katrin Roque as previously scheduled.  If you have fevers, chills, chest pain, shortness of breath, nausea, or vomiting call the number provided or return to the ER.

## 2021-03-10 NOTE — DISCHARGE NOTE PROVIDER - NSDCMRMEDTOKEN_GEN_ALL_CORE_FT
gabapentin 250 mg/5 mL oral solution: 2.5 milliliter(s) orally 3 times a day   gabapentin 250 mg/5 mL oral solution: 2.5 milliliter(s) orally 3 times a day  labetalol 200 mg oral tablet: 2 tab(s) orally every 8 hours

## 2021-03-17 ENCOUNTER — APPOINTMENT (OUTPATIENT)
Dept: SURGERY | Facility: CLINIC | Age: 36
End: 2021-03-17
Payer: COMMERCIAL

## 2021-03-17 VITALS — WEIGHT: 289 LBS | HEIGHT: 72.5 IN | BODY MASS INDEX: 38.72 KG/M2 | TEMPERATURE: 96.1 F

## 2021-03-17 PROCEDURE — 99024 POSTOP FOLLOW-UP VISIT: CPT

## 2021-03-17 RX ORDER — GABAPENTIN 250 MG/5ML
250 SOLUTION ORAL EVERY 8 HOURS
Qty: 15 | Refills: 0 | Status: DISCONTINUED | COMMUNITY
Start: 2021-03-03 | End: 2021-03-17

## 2021-03-17 NOTE — HISTORY OF PRESENT ILLNESS
[Procedure: ___] : Procedure performed: [unfilled]  [Date of Surgery: ___] : Date of Surgery:   [unfilled] [Surgeon Name:   ___] : Surgeon Name: Dr. BYNUM [___ Days Post Op] : [unfilled] days  [Phase 2] : Phase 2 [Walking] : walking [de-identified] : 34yo male with PMHx of class III obesity and SHAKIR recently underwent laparoscopic sleeve gastrectomy 3/8/2021. Patient has no complaints. Pain is minimal. Denies fever/chills, nausea/vomiting, chest pain or shortness of breath. Tolerating puree diet. Ate tuna salad. Having bowel movement.

## 2021-03-17 NOTE — PHYSICAL EXAM
[de-identified] : soft, non-tender, no rebound/guarding, no masses/hernias, wounds without erythema, drainage, or induration

## 2021-03-17 NOTE — ASSESSMENT
[___ Days Post Op] : [unfilled] days [de-identified] : 34yo male with PMHx of class III obesity and SHAKIR recently underwent laparoscopic sleeve gastrectomy 3/8/2021. Doing well.\par -Tolerating bariatric puree diet - advance as scheduled\par -continue PPI\par -continue MV, calcium, and B12\par -continue ambulating \par -no heavy lifting x 6 weeks total\par -return to office post-operatively for 1 month follow up\par -call with concerns

## 2021-03-17 NOTE — CONSULT LETTER
[Dear  ___] : Dear  [unfilled], [Courtesy Letter:] : I had the pleasure of seeing your patient, [unfilled], in my office today. [Please see my note below.] : Please see my note below. [Sincerely,] : Sincerely, [FreeTextEntry3] : Clover Roque MD FACS\par Bariatric & Minimally Invasive Surgery\par [facility], [place]\par [phone]

## 2021-04-09 ENCOUNTER — APPOINTMENT (OUTPATIENT)
Dept: SURGERY | Facility: CLINIC | Age: 36
End: 2021-04-09
Payer: COMMERCIAL

## 2021-04-09 VITALS — WEIGHT: 270 LBS | HEIGHT: 72.5 IN | TEMPERATURE: 98.6 F | BODY MASS INDEX: 36.17 KG/M2

## 2021-04-09 PROCEDURE — 99024 POSTOP FOLLOW-UP VISIT: CPT

## 2021-04-09 NOTE — PHYSICAL EXAM
[de-identified] : soft, non-tender, no rebound/guarding, no masses/hernias, wounds without erythema, drainage, or induration

## 2021-04-09 NOTE — ASSESSMENT
[___ Months Post Op] : [unfilled] months [de-identified] : 36yo male with PMHx of SHAKIR and class III obesity s/p laparoscopic sleeve gastrectomy 3/8/2021.\par -encouraged to continue eating food regularly \par -continue protein intake - goal of 70g per day, drinking 1 shake per day\par -continue water intake - 64oz per day\par -exercise regimen\par -Rx actigal\par -continue PPI, MV, calcium, B12\par -return to office in 2 months\par -labs ordered for next appointment

## 2021-04-09 NOTE — HISTORY OF PRESENT ILLNESS
[Procedure: ___] : Procedure performed: [unfilled]  [Date of Surgery: ___] : Date of Surgery:   [unfilled] [Surgeon Name:   ___] : Surgeon Name: Dr. BYNUM [___ Months Post Op] : [unfilled] months [Phase 4] : Phase 4 [de-identified] : 36yo male with PMHx of class III obesity and SHAKIR recently underwent laparoscopic sleeve gastrectomy 3/8/2021. Sleeping well - not using CPAP. Diet - yogurt, egg, turkey with cheese. Admits that he occasionally does not eat anything though. 1 protein shake per day. 64oz of water per day. Reports "bubbling of the stomach." Denies reflux symptoms, taking PPI, taking MV, calcium, B12. Exercise - active at work, not going to gym because of car issues. Having bowel movements.

## 2021-06-11 ENCOUNTER — APPOINTMENT (OUTPATIENT)
Dept: SURGERY | Facility: CLINIC | Age: 36
End: 2021-06-11
Payer: COMMERCIAL

## 2021-06-11 VITALS
DIASTOLIC BLOOD PRESSURE: 78 MMHG | HEIGHT: 72.5 IN | WEIGHT: 224 LBS | BODY MASS INDEX: 30.01 KG/M2 | SYSTOLIC BLOOD PRESSURE: 130 MMHG | HEART RATE: 58 BPM | TEMPERATURE: 97.9 F

## 2021-06-11 PROCEDURE — 99212 OFFICE O/P EST SF 10 MIN: CPT

## 2021-06-11 RX ORDER — URSODIOL 300 MG/1
300 CAPSULE ORAL
Qty: 90 | Refills: 3 | Status: COMPLETED | COMMUNITY
Start: 2021-04-09 | End: 2021-06-11

## 2021-06-11 RX ORDER — PANTOPRAZOLE 40 MG/1
40 TABLET, DELAYED RELEASE ORAL DAILY
Qty: 30 | Refills: 2 | Status: DISCONTINUED | COMMUNITY
Start: 2021-03-03 | End: 2021-06-11

## 2021-06-11 NOTE — PHYSICAL EXAM
[Obese, well nourished, in no acute distress] : obese, well nourished, in no acute distress [Normal] : affect appropriate [de-identified] : obese, soft, non-tender, no rebound/guarding [de-identified] : no CVA tenderness B/L

## 2021-06-11 NOTE — HISTORY OF PRESENT ILLNESS
[Procedure: ___] : Procedure performed: [unfilled]  [Date of Surgery: ___] : Date of Surgery:   [unfilled] [Surgeon Name:   ___] : Surgeon Name: Dr. BYNUM [___ Months Post Op] : [unfilled] months [de-identified] : 36yo male with PMHx of class III obesity and SHAKIR recently underwent laparoscopic sleeve gastrectomy 3/8/2021. Sleeping well - not using CPAP. Diet - egg whites and swiss cheese or hard boiled egg, yogurt, cold cuts, avoiding carbs, turkey meatloaf, cold cuts. Drinking protein 2 per day - 40g per shake. Water - 64oz of water per day. Denies reflux symptoms, stopped taking PPI. Taking MV, calcium, B12. Has not taken Actigal. Exercise - go the gym three times per week, 20 minutes on elliptical, 20 minutes on bike, 20 minutes on treadmill, weight lifting

## 2021-06-11 NOTE — ASSESSMENT
[FreeTextEntry1] : 34yo male with PMHx of HTN, SHAKIR, L4-5 herniated disc, L knee arthritis, depression (hospitalized 2009) and morbid obesity s/p sleeve gastrectomy 3/8/2021. Doing well.\par -continue protein intake - 70g per day\par -continue water intake - goal of 60oz per day\par -discontinue PPI\par -continue MV, calcium and B12\par -medications reviewed and amended\par -not taking Actigal\par -weight loss progression - 315lb preop 3/5/2021 --> 289lb 3/17 --> 270lb 4/2021 --> 224lb 6/11/2021\par -continue exercise as tolerated - continue resistance training and cardio\par -labs done recently - will review\par -labs ordered for prior to next visit\par -return to office for 3 month follow up\par -call with concerns\par

## 2021-06-25 ENCOUNTER — NON-APPOINTMENT (OUTPATIENT)
Age: 36
End: 2021-06-25

## 2021-07-14 ENCOUNTER — NON-APPOINTMENT (OUTPATIENT)
Age: 36
End: 2021-07-14

## 2021-07-21 ENCOUNTER — EMERGENCY (EMERGENCY)
Facility: HOSPITAL | Age: 36
LOS: 0 days | Discharge: HOME | End: 2021-07-22
Attending: EMERGENCY MEDICINE | Admitting: EMERGENCY MEDICINE
Payer: COMMERCIAL

## 2021-07-21 VITALS
RESPIRATION RATE: 17 BRPM | TEMPERATURE: 99 F | SYSTOLIC BLOOD PRESSURE: 136 MMHG | HEART RATE: 61 BPM | DIASTOLIC BLOOD PRESSURE: 82 MMHG | HEIGHT: 73 IN | OXYGEN SATURATION: 96 %

## 2021-07-21 DIAGNOSIS — F43.21 ADJUSTMENT DISORDER WITH DEPRESSED MOOD: ICD-10-CM

## 2021-07-21 DIAGNOSIS — M25.561 PAIN IN RIGHT KNEE: ICD-10-CM

## 2021-07-21 DIAGNOSIS — Z87.891 PERSONAL HISTORY OF NICOTINE DEPENDENCE: ICD-10-CM

## 2021-07-21 DIAGNOSIS — G47.33 OBSTRUCTIVE SLEEP APNEA (ADULT) (PEDIATRIC): ICD-10-CM

## 2021-07-21 DIAGNOSIS — M54.5 LOW BACK PAIN: ICD-10-CM

## 2021-07-21 DIAGNOSIS — F32.9 MAJOR DEPRESSIVE DISORDER, SINGLE EPISODE, UNSPECIFIED: ICD-10-CM

## 2021-07-21 DIAGNOSIS — I10 ESSENTIAL (PRIMARY) HYPERTENSION: ICD-10-CM

## 2021-07-21 DIAGNOSIS — E66.9 OBESITY, UNSPECIFIED: ICD-10-CM

## 2021-07-21 DIAGNOSIS — Z98.84 BARIATRIC SURGERY STATUS: ICD-10-CM

## 2021-07-21 DIAGNOSIS — Z87.81 PERSONAL HISTORY OF (HEALED) TRAUMATIC FRACTURE: Chronic | ICD-10-CM

## 2021-07-21 DIAGNOSIS — Z98.84 BARIATRIC SURGERY STATUS: Chronic | ICD-10-CM

## 2021-07-21 LAB
BASOPHILS # BLD AUTO: 0.03 K/UL — SIGNIFICANT CHANGE UP (ref 0–0.2)
BASOPHILS NFR BLD AUTO: 0.4 % — SIGNIFICANT CHANGE UP (ref 0–1)
EOSINOPHIL # BLD AUTO: 0.07 K/UL — SIGNIFICANT CHANGE UP (ref 0–0.7)
EOSINOPHIL NFR BLD AUTO: 0.9 % — SIGNIFICANT CHANGE UP (ref 0–8)
HCT VFR BLD CALC: 40.9 % — LOW (ref 42–52)
HGB BLD-MCNC: 13.4 G/DL — LOW (ref 14–18)
IMM GRANULOCYTES NFR BLD AUTO: 0.1 % — SIGNIFICANT CHANGE UP (ref 0.1–0.3)
LYMPHOCYTES # BLD AUTO: 2.32 K/UL — SIGNIFICANT CHANGE UP (ref 1.2–3.4)
LYMPHOCYTES # BLD AUTO: 30.4 % — SIGNIFICANT CHANGE UP (ref 20.5–51.1)
MCHC RBC-ENTMCNC: 28.8 PG — SIGNIFICANT CHANGE UP (ref 27–31)
MCHC RBC-ENTMCNC: 32.8 G/DL — SIGNIFICANT CHANGE UP (ref 32–37)
MCV RBC AUTO: 88 FL — SIGNIFICANT CHANGE UP (ref 80–94)
MONOCYTES # BLD AUTO: 0.59 K/UL — SIGNIFICANT CHANGE UP (ref 0.1–0.6)
MONOCYTES NFR BLD AUTO: 7.7 % — SIGNIFICANT CHANGE UP (ref 1.7–9.3)
NEUTROPHILS # BLD AUTO: 4.61 K/UL — SIGNIFICANT CHANGE UP (ref 1.4–6.5)
NEUTROPHILS NFR BLD AUTO: 60.5 % — SIGNIFICANT CHANGE UP (ref 42.2–75.2)
NRBC # BLD: 0 /100 WBCS — SIGNIFICANT CHANGE UP (ref 0–0)
PLATELET # BLD AUTO: 258 K/UL — SIGNIFICANT CHANGE UP (ref 130–400)
RBC # BLD: 4.65 M/UL — LOW (ref 4.7–6.1)
RBC # FLD: 14 % — SIGNIFICANT CHANGE UP (ref 11.5–14.5)
WBC # BLD: 7.63 K/UL — SIGNIFICANT CHANGE UP (ref 4.8–10.8)
WBC # FLD AUTO: 7.63 K/UL — SIGNIFICANT CHANGE UP (ref 4.8–10.8)

## 2021-07-21 PROCEDURE — 99285 EMERGENCY DEPT VISIT HI MDM: CPT

## 2021-07-21 PROCEDURE — 99214 OFFICE O/P EST MOD 30 MIN: CPT | Mod: GC

## 2021-07-21 NOTE — ED ADULT NURSE NOTE - NSIMPLEMENTINTERV_GEN_ALL_ED
Implemented All Universal Safety Interventions:  Diagonal to call system. Call bell, personal items and telephone within reach. Instruct patient to call for assistance. Room bathroom lighting operational. Non-slip footwear when patient is off stretcher. Physically safe environment: no spills, clutter or unnecessary equipment. Stretcher in lowest position, wheels locked, appropriate side rails in place.

## 2021-07-21 NOTE — ED ADULT NURSE NOTE - OBJECTIVE STATEMENT
Pt BIBA . As per EMS, pt Texted ex wife that he was going to kill himself. pt reports "feeling sad" recently . denying suicidal /homicidal ideations 1:1 initated in triage. belongings checked with security.

## 2021-07-21 NOTE — ED ADULT TRIAGE NOTE - CHIEF COMPLAINT QUOTE
Pt BIBA . As per EMS, pt Texted ex wife that he was going to kill himself. In triage, Pt reports feeling sad because of his recent divorce. denying suicidal /homicidal ideations in triage. charge nurse aware. one to one sit initiated.

## 2021-07-22 VITALS
DIASTOLIC BLOOD PRESSURE: 69 MMHG | HEART RATE: 52 BPM | TEMPERATURE: 98 F | SYSTOLIC BLOOD PRESSURE: 120 MMHG | OXYGEN SATURATION: 100 % | RESPIRATION RATE: 18 BRPM

## 2021-07-22 DIAGNOSIS — F43.21 ADJUSTMENT DISORDER WITH DEPRESSED MOOD: ICD-10-CM

## 2021-07-22 LAB
ALBUMIN SERPL ELPH-MCNC: 4.3 G/DL — SIGNIFICANT CHANGE UP (ref 3.5–5.2)
ALP SERPL-CCNC: 76 U/L — SIGNIFICANT CHANGE UP (ref 30–115)
ALT FLD-CCNC: 10 U/L — SIGNIFICANT CHANGE UP (ref 0–41)
AMPHET UR-MCNC: NEGATIVE — SIGNIFICANT CHANGE UP
ANION GAP SERPL CALC-SCNC: 7 MMOL/L — SIGNIFICANT CHANGE UP (ref 7–14)
APAP SERPL-MCNC: <5 UG/ML — LOW (ref 10–30)
AST SERPL-CCNC: 14 U/L — SIGNIFICANT CHANGE UP (ref 0–41)
BARBITURATES UR SCN-MCNC: NEGATIVE — SIGNIFICANT CHANGE UP
BENZODIAZ UR-MCNC: POSITIVE
BILIRUB SERPL-MCNC: 0.5 MG/DL — SIGNIFICANT CHANGE UP (ref 0.2–1.2)
BUN SERPL-MCNC: 11 MG/DL — SIGNIFICANT CHANGE UP (ref 10–20)
CALCIUM SERPL-MCNC: 9.3 MG/DL — SIGNIFICANT CHANGE UP (ref 8.5–10.1)
CHLORIDE SERPL-SCNC: 104 MMOL/L — SIGNIFICANT CHANGE UP (ref 98–110)
CO2 SERPL-SCNC: 30 MMOL/L — SIGNIFICANT CHANGE UP (ref 17–32)
COCAINE METAB.OTHER UR-MCNC: NEGATIVE — SIGNIFICANT CHANGE UP
COVID-19 SPIKE DOMAIN AB INTERP: NEGATIVE — SIGNIFICANT CHANGE UP
COVID-19 SPIKE DOMAIN ANTIBODY RESULT: 0.4 U/ML — SIGNIFICANT CHANGE UP
CREAT SERPL-MCNC: 1 MG/DL — SIGNIFICANT CHANGE UP (ref 0.7–1.5)
DRUG SCREEN 1, URINE RESULT: SIGNIFICANT CHANGE UP
ETHANOL SERPL-MCNC: <10 MG/DL — SIGNIFICANT CHANGE UP
GLUCOSE SERPL-MCNC: 94 MG/DL — SIGNIFICANT CHANGE UP (ref 70–99)
METHADONE UR-MCNC: POSITIVE
OPIATES UR-MCNC: NEGATIVE — SIGNIFICANT CHANGE UP
PCP UR-MCNC: NEGATIVE — SIGNIFICANT CHANGE UP
POTASSIUM SERPL-MCNC: 4.5 MMOL/L — SIGNIFICANT CHANGE UP (ref 3.5–5)
POTASSIUM SERPL-SCNC: 4.5 MMOL/L — SIGNIFICANT CHANGE UP (ref 3.5–5)
PROPOXYPHENE QUALITATIVE URINE RESULT: NEGATIVE — SIGNIFICANT CHANGE UP
PROT SERPL-MCNC: 7 G/DL — SIGNIFICANT CHANGE UP (ref 6–8)
SALICYLATES SERPL-MCNC: <0.3 MG/DL — LOW (ref 4–30)
SARS-COV-2 IGG+IGM SERPL QL IA: 0.4 U/ML — SIGNIFICANT CHANGE UP
SARS-COV-2 IGG+IGM SERPL QL IA: NEGATIVE — SIGNIFICANT CHANGE UP
SARS-COV-2 RNA SPEC QL NAA+PROBE: SIGNIFICANT CHANGE UP
SODIUM SERPL-SCNC: 141 MMOL/L — SIGNIFICANT CHANGE UP (ref 135–146)
THC UR QL: POSITIVE

## 2021-07-22 PROCEDURE — 99220: CPT

## 2021-07-22 PROCEDURE — 93010 ELECTROCARDIOGRAM REPORT: CPT

## 2021-07-22 NOTE — PROGRESS NOTE BEHAVIORAL HEALTH - CASE SUMMARY
35-year-old male; domiciled alone; employed in plumbing and heating; shares custody of 3 yo daughter with ex wife; PPHx of anxiety, depression no prior history of inpatient psychiatry admission, here in the ED for concerns of suicidal ideation. On evaluation, patient is free of thought of self-harm and no psychosis elicited. Currently there is no indication for inpatient psychiatry admission. Patient is future oriented. he has denied suicidal ideation, currently, furthermore, strongest protective factor is his daughter, whom he loves. He specifically stated he would never hurt himself, noting he has to be alive and needs to be alive for his daughter. This patient will continue follow up with his outpatient therapist upon discharge from the Ed.

## 2021-07-22 NOTE — ED CDU PROVIDER INITIAL DAY NOTE - OBJECTIVE STATEMENT
34 y/o M, PMHx HTN, SHAKIR & s/p Gastric Bypass, presents to the ED with complaints of suicidal ideation. Patient has been feeling depressed as he is currently in the process of  from his wife. He messaged his sister-in-law that he was feeling suicidal and she activated the emergency repsonse team prompting ED visit. He denies prior suicide attempts and prior IPP admission. He was evaluated by tele-psych overnight and they requested for reassessment by psychiatry team in person today.

## 2021-07-22 NOTE — ED BEHAVIORAL HEALTH ASSESSMENT NOTE - DESCRIPTION
see BH note    COVID Exposure Screen- Patient  1.	*Have you had a COVID-19 test in the last 90 days?  ( x ) Yes, today - negative   (  ) No   (  ) Unknown- Reason: _____  2.	*Have you tested positive for COVID-19 antibodies? (  ) Yes   ( x ) No   (  ) Unknown- Reason: _____  3.	*Have you received 2 doses of the COVID-19 vaccine? (  ) Yes   ( x ) No   (  ) Unknown- Reason: _____  4.	*In the past 10 days, have you been around anyone with a positive COVID-19 test?* (  ) Yes   ( x ) No   (  ) Unknown- Reason: ____  5.	*Have you been out of [place] within the past 10 days?* ( x ) Yes, [place] on 7/17/21   (  ) No   (  ) Unknown- Reason: _____ as per HPI

## 2021-07-22 NOTE — PROGRESS NOTE BEHAVIORAL HEALTH - NSBHCHARTREVIEWINVESTIGATE_PSY_A_CORE FT
< from: 12 Lead ECG (07.22.21 @ 00:48) >    Ventricular Rate 40 BPM  Atrial Rate 40 BPM  P-R Interval 164 ms  QRS Duration 106 ms  Q-T Interval 468 ms    QTC Calculation(Bazett) 381 ms    P Axis -14 degrees  R Axis 57 degrees  T Axis 43 degrees    Diagnosis Line Marked sinus bradycardia  Abnormal ECG    Confirmed by Austin Dinero ([id number]) on 7/22/2021 8:15:38 AM    < end of copied text >

## 2021-07-22 NOTE — PROGRESS NOTE BEHAVIORAL HEALTH - NSBHFUPINTERVALHXFT_PSY_A_CORE
<<<IN PROGRESS>>>    DOLORES MEEK (MRN-[id number]) is a 35-year-old male; domiciled alone; employed in plumbing and heating; shares custody of 3 yo daughter with ex wife; PPHx of anxiety, depression, prior ER visit in 2009, denies prior hospitalizations, denies hx of SIB/SA; PMHx of bariatric surgery (March 2021), hx sleep apnea (resolved after bariatric surgery), herniated discs, torn meniscus and arthritis of knee; BIB EMS.  Psychiatry consult was placed for mental health evaluation as the team is concerned for suicidal ideation.     Chart review showed that patent had a previous admission for one day on 5/5/2019, during which time, patient has argument with ex-girlfriend. Per chart, purvi told ex-girlfirend he wanted to kill himself, but told ems he only said that to get a rise of of her, he was under influence of cbd oil.     Patient has a     Per nursing, patient is...  Patient <did/did not> receive PRNs for....      Patient seen and examined at bedside. Patient reports ...    Patient <reports/denies> symptoms of depression, anxiety, or PTSD.   Patient <is/is not> exhibiting symptoms of cecelia, depression, or psychosis.   Patient <reports/denies> suicidal ideation, thoughts of self-harm, or thoughts of hurting others.   Patient <reports/denies> substances use, including EtOH, tobacco, marijuana, or other substances. <<<IN PROGRESS>>>    DOLORES MEEK (MRN-[id number]) is a 35-year-old male; domiciled alone; employed in plumbing and heating; shares custody of 3 yo daughter with ex wife; PPHx of anxiety, depression, prior ER visit in 2009, denies prior hospitalizations, denies hx of SIB/SA; PMHx of bariatric surgery (March 2021), hx sleep apnea (resolved after bariatric surgery), herniated discs, torn meniscus and arthritis of knee; BIB EMS.  Drug screen results are still pending. Patient reportedly sent a text message to sister in law expressing claiming that he wanted to kill himself, but details unclear. Psychiatry consult was placed for mental health evaluation as the team is concerned for suicidal ideation.     Chart review showed that patent had a previous admission for one day on 5/5/2019, during which time, patient has argument with ex-girlfriend. Per chart, ramónnet told ex-girlfirend he wanted to kill himself, but told ems he only said that to get a rise of of her, he was under influence of cbd oil.     Per overnight handoff, patient is future-oriented and wants to return to work. Overnight team reportedly was able to get in contact with patient's father, but patient's father was unable to clarify text message. Team reportedly reached out to patient's sister and sister in law, and outpatient therapist (Alberto Tripathi) and left messages. Patient reportedly attempted to leave the hospital overnight.       Per nursing, patient is...  In AM, patient currently not on any medications in hospital.     Patient seen and examined at bedside. Patient reports ...    Patient <reports/denies> symptoms of depression, anxiety, or PTSD.   Patient <is/is not> exhibiting symptoms of cecelia, depression, or psychosis.   Patient <reports/denies> suicidal ideation, thoughts of self-harm, or thoughts of hurting others.   Patient <reports/denies> substances use, including EtOH, tobacco, marijuana, or other substances.    pharmacy information.   collateral <<<IN PROGRESS>>>    DOLORES MEEK (MRN-[id number]) is a 35-year-old male; domiciled alone; employed in plumbing and heating; shares custody of 3 yo daughter with ex wife; PPHx of anxiety, depression, prior ER visit in 2009, denies prior hospitalizations, denies hx of SIB/SA; PMHx of bariatric surgery (March 2021), hx sleep apnea (resolved after bariatric surgery), herniated discs, torn meniscus and arthritis of knee; BIB EMS.  Drug screen results are still pending. Patient reportedly sent a text message to sister in law expressing claiming that he wanted to kill himself, but details unclear. Psychiatry consult was placed for mental health evaluation as the team is concerned for suicidal ideation.     Chart review showed that patent had a previous admission for one day on 5/5/2019, during which time, patient has argument with ex-girlfriend. Per chart, ramónnet told ex-girlfirend he wanted to kill himself, but told ems he only said that to get a rise of of her, he was under influence of cbd oil. Patient had reportedly broken up with this ex-girlfriend two months prior.     Per overnight handoff, patient is future-oriented and wants to return to work. Overnight team reportedly was able to get in contact with patient's father, but patient's father was unable to clarify text message. Team reportedly reached out to patient's sister and sister in law, and outpatient therapist (Alberto Tripathi) and left messages. Patient reportedly attempted to leave the hospital overnight.     Per nursing, patient is...  In AM, patient currently not on any medications in hospital.     Patient seen and examined at bedside. Patient reports ...    Patient <reports/denies> symptoms of depression, anxiety, or PTSD.     Patient <is/is not> exhibiting symptoms of cecelia, depression, or psychosis.     Patient <reports/denies> suicidal ideation, thoughts of self-harm, or thoughts of hurting others.     Patient <reports/denies> substances use, including EtOH, tobacco, marijuana, or other substances.    pharmacy information.   collateral    Collateral  -Pt's Father Ernesto   -Pt's Sister Fiorella ([phone]) but there is no answer, states she works nights so may not be available.  -Pt's Sister-in law Citllali ([phone]) who reportedly activated 911, awaiting return call. <<<IN PROGRESS>>>    DOLORES MEEK (MRN-[id number]) is a 35-year-old male; domiciled alone; employed in plumbing and heating; shares custody of 3 yo daughter with ex wife; PPHx of anxiety, depression, prior ER visit in 2009, denies prior hospitalizations, denies hx of SIB/SA; PMHx of bariatric surgery (March 2021), hx sleep apnea (resolved after bariatric surgery), herniated discs, torn meniscus and arthritis of knee; BIB EMS.  Drug screen results are still pending. Patient reportedly sent a text message to sister in law expressing claiming that he wanted to kill himself, but details unclear. Psychiatry consult was placed for mental health evaluation as the team is concerned for suicidal ideation.     Chart review showed that patent had a previous admission for one day on 5/5/2019, during which time, patient has argument with ex-girlfriend. Per chart, ramónnet told ex-girlfirend he wanted to kill himself, but told ems he only said that to get a rise of of her, he was under influence of cbd oil. Patient had reportedly broken up with this ex-girlfriend two months prior.     Per overnight handoff, patient is future-oriented and wants to return to work. Overnight team reportedly was able to get in contact with patient's father, but patient's father was unable to clarify text message. Team reportedly reached out to patient's sister and sister in law, and outpatient therapist (Alberto Tripathi) and left messages. Patient reportedly attempted to leave the hospital overnight.     Per nursing, patient is...  In AM, patient currently not on any medications in hospital.     Patient seen and examined at bedside. Patient reports ...    Patient <reports/denies> symptoms of depression, anxiety, or PTSD.     Patient <is/is not> exhibiting symptoms of cecelia, depression, or psychosis.     Patient <reports/denies> suicidal ideation, thoughts of self-harm, or thoughts of hurting others.     Patient <reports/denies> substances use, including EtOH, tobacco, marijuana, or other substances.    pharmacy information.   collateral    Collateral  -Pt's Father Ernesto   -Pt's Sister Fiorella ([phone]) but there is no answer, states she works nights so may not be available.  -Pt's Sister-in law Citlalli ([phone]) who reportedly activated 911, awaiting return call.  -Therapist, Alberto Tripathi ([phone]) <<<IN PROGRESS>>>    DOLORES MEEK (MRN-[id number]) is a 35-year-old male; domiciled alone; employed in plumbing and heating; shares custody of 3 yo daughter with ex wife; PPHx of anxiety, depression, prior ER visit in 2009, denies prior hospitalizations, denies hx of SIB/SA; PMHx of bariatric surgery (March 2021), hx sleep apnea (resolved after bariatric surgery), herniated discs, torn meniscus and arthritis of knee; BIB EMS.  Drug screen results are still pending. Patient reportedly sent a text message to sister in law expressing claiming that he wanted to kill himself, but details unclear. Psychiatry consult was placed for mental health evaluation as the team is concerned for suicidal ideation.     Chart review showed that patent had a previous admission for one day on 5/5/2019, during which time, patient has argument with ex-girlfriend. Per chart, ramónnet told ex-girlfirend he wanted to kill himself, but told ems he only said that to get a rise of of her, he was under influence of cbd oil. Patient had reportedly broken up with this ex-girlfriend two months prior.     Per overnight handoff, patient is future-oriented and wants to return to work. Overnight team reportedly was able to get in contact with patient's father, but patient's father was unable to clarify text message. Team reportedly reached out to patient's sister and sister in law, and outpatient therapist (Alberto Tripathi) and left messages. Patient reportedly attempted to leave the hospital overnight.     Per nursing, patient is...  In AM, patient currently not on any medications in hospital.     Patient seen and examined at bedside. Patient reports ...    Patient <reports/denies> symptoms of depression, anxiety, or PTSD.     Patient <is/is not> exhibiting symptoms of cecelia, depression, or psychosis.     Patient <reports/denies> suicidal ideation, thoughts of self-harm, or thoughts of hurting others.     Patient <reports/denies> substances use, including EtOH, tobacco, marijuana, or other substances.    pharmacy information.   collateral    Collateral  -Pt's Father Ernesto   -Pt's Sister Fiorella ([phone]) but there is no answer, states she works nights so may not be available.  -Pt's Sister-in law Citlalli ([phone]) who reportedly activated 911, awaiting return call.      Spoke with patient's therapist, Alberto Tripathi ([phone]) as per policy for outpatient therapy follow up. Deuce is on board with plan for discharge today; states that patient's comments were likely to be "histrionic" and is not concerned for risk of patient harm to self or others at this time. Therapist suggested  for patient due to ongoing difficulties with wife regarding seeing the daughter. Therapist states that he is currently on vacation; is scheduled to set patient on Wednesday next week 7/28.     Spoke with patient's father, Ernesto ([phone])... DOLORES MEEK (MRN-[id number]) is a 35-year-old male; domiciled alone; employed in plumbing and heating; shares custody of 3 yo daughter with ex wife; PPHx of anxiety, depression, prior ER visit in 2009, denies prior hospitalizations, denies hx of SIB/SA; PMHx of bariatric surgery (March 2021), hx sleep apnea (resolved after bariatric surgery), herniated discs, torn meniscus and arthritis of knee; BIB EMS.  Drug screen results are still pending. Patient reportedly sent a text message to sister in law expressing claiming that he wanted to kill himself, but details unclear. Psychiatry consult was placed for mental health evaluation as the team is concerned for suicidal ideation.     Chart review showed that patent had a previous admission for one day on 5/5/2019, during which time, patient has argument with ex-girlfriend. Per chart, patient told ex-girlfirend he wanted to kill himself, but told ems he only said that to get a rise of out of her, he was under influence of cbd oil. Patient had reportedly broken up with this ex-girlfriend two months prior.     Per overnight handoff, patient is future-oriented and wants to return to work. Overnight team reportedly was able to get in contact with patient's father, but patient's father was unable to clarify text message. Team reportedly reached out to patient's sister and sister in law, and outpatient therapist (Alberto Tripathi) and left messages. Patient reportedly attempted to leave the hospital overnight.     In AM, at time of interview patient currently not on any medications in hospital. No PRNS.   Patient seen and examined at bedside. Patient reports that he was venting to his sister-in law and was feeling "very sad" yesterday because he has not been able to see his daughter as much as he would like. Patient states that his wife (still , not legally ). Affect was positive; patient was intermittently tearful when talking about how much he wanted to spend more time with his daughter. Patient states that the last time he was his daughter was On July 16th and 17th, and the previous time before then was on Father's day. Patient repeatedly stated that he has no thoughts of self-harm, stating, "I need to exist, every girl needs her father."     Patient denies symptoms of depression (with the exception of decreased appetite s/p gastric sleeve surgery), symptoms of anxiety, or PTSD.  Patient not exhibiting symptoms of cecelia, depression, or psychosis. Patient repeatedly denied suicidal ideation, thoughts of self-harm, or thoughts of hurting others. Patient reports smoking tobacco (1/2 pack per day, 15 years), but otherwise denies recent substances use, including EtOH, tobacco, marijuana, or other substances.     Contacted patient's pharmacy ([facility] - [address]; [phone]) for medication reconciliation. Pharmacy lists the following medications  -ursodiol 300 mg, 1 capsule, 2-3x daily; last picked up May 6th, 2021 (30d supply)  -labetalol 200 mg, 2 tablets daily; last picked up March 10th 2021, (30d supply)  -pantoprazole 40mg 1x day; last picked up March 3rd 2021   -gabapentin 200 mg, picked up march 3rd, 2021 (4d supply)    Attempted to call patient's Father Ernesto, pt's Sister Fiorella ([phone]). and pt's Sister-in law Citlalli ([phone]), multiple times, but was unable to reach them.     Spoke with patient's therapist, Alberto Tripathi ([phone]) as per policy for outpatient therapy follow up. Deuce is on board with plan for discharge today; states that patient's comments were likely to be "histrionic" and is not concerned for risk of patient harm to self or others at this time. Deuce suggested offering  for patient due to ongoing difficulties with wife regarding seeing the daughter. Therapist states that he is currently on vacation at the moment; states that he is scheduled to set patient on Wednesday next week 7/28.     Attempted to reach patient's father Ernesto ([phone]), to inform that patient would be discharged today, but Ernesto but did not .

## 2021-07-22 NOTE — ED PROVIDER NOTE - CLINICAL SUMMARY MEDICAL DECISION MAKING FREE TEXT BOX
SI - telepsych unable to obtain useful collateral from family, left vcmail for sister & sister-in-law, rec [facility] pending collateral, possible discharge - EDOU as [facility] pending reassessment

## 2021-07-22 NOTE — ED CDU PROVIDER INITIAL DAY NOTE - MEDICAL DECISION MAKING DETAILS
36 y/o M, PMHx HTN, SHAKIR & s/p Gastric Bypass, presents to the ED with complaints of suicidal ideation retracts his statements. pt well appearing, calm, cleared by psych

## 2021-07-22 NOTE — PROGRESS NOTE BEHAVIORAL HEALTH - NSBHCHARTREVIEWVS_PSY_A_CORE FT
Vital Signs Last 24 Hrs  T(C): 36.4 (22 Jul 2021 07:43), Max: 37.1 (21 Jul 2021 22:50)  T(F): 97.6 (22 Jul 2021 07:43), Max: 98.7 (21 Jul 2021 22:50)  HR: 52 (22 Jul 2021 07:43) (52 - 80)  BP: 120/69 (22 Jul 2021 07:43) (120/69 - 136/82)  BP(mean): --  RR: 18 (22 Jul 2021 07:43) (17 - 18)  SpO2: 100% (22 Jul 2021 07:43) (96% - 100%)

## 2021-07-22 NOTE — ED CDU PROVIDER DISPOSITION NOTE - NSFOLLOWUPCLINICS_GEN_ALL_ED_FT
[facility]  [facility]  [address] [place] [address]  Phone: [phone]  Fax:   Follow Up Time: 1-3 Days

## 2021-07-22 NOTE — ED BEHAVIORAL HEALTH ASSESSMENT NOTE - SUMMARY
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  Per ED documentation, pt sent suicidal text to ex wife, which pt denies and states his sister-in-law called police for a wellness check.  Only collateral at this time is from pt's father, who was unaware of pt's ED visit or presenting issue.  Will hold pt in ED in an attempt to gain additional collateral given discrepancy of reports (VM left for pt's sister-in-law, sister, outpatient psychiatric provider).  If no collateral becomes available, anticipate likely discharge as pt denies active SI/HI and appears future-oriented. 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  Per ED documentation, pt sent suicidal text to ex wife, which pt denies and states his sister-in-law called police for a wellness check.  Per ED attending, pt admitted to texting his sister-in-law that he wanted to kill himself.  Pt also attempted to elope from the ED and was found outside by security (?in the street vs sidewalk).  Only collateral at this time is from pt's father, who was unaware of pt's ED visit or presenting issue.  Will hold pt in ED in an attempt to gain additional collateral given discrepancy of reports (VMs left for pt's sister-in-law, sister, outpatient psychiatric provider).  If no collateral becomes available, anticipate likely discharge as pt denies active SI/HI and appears future-oriented.

## 2021-07-22 NOTE — ED PROVIDER NOTE - PROGRESS NOTE DETAILS
KT: around 4:15 am pt eloped from ED while under 1:1 observation, was escorted back to ED by security KT: per telepsych Dr. Deras s/w pts father who was unaware of situation, and they have been unable to contact other family members (sister, sister-in-law) for collateral, left vcmails for both, pt will likely be discharged but need to s/w other family first - will place in EDOU as [facility] pending reassessment

## 2021-07-22 NOTE — ED BEHAVIORAL HEALTH ASSESSMENT NOTE - LEGAL HISTORY
hx of arrest in 2018 (pt reports he left the home after an argument and took his ex's phone but later proved that he owned the phone so no charges)

## 2021-07-22 NOTE — ED CDU PROVIDER INITIAL DAY NOTE - FAMILY HISTORY
Father  Still living? Unknown  FH: asthma, Age at diagnosis: Age Unknown     Mother  Still living? Unknown  FH: CAD (coronary artery disease), Age at diagnosis: Age Unknown

## 2021-07-22 NOTE — ED PROVIDER NOTE - NS ED ROS FT
Constitutional: No fever, chills, unintended weight loss.  Eyes:  No visual changes, eye pain or discharge.  ENMT:  No hearing changes, pain, no sore throat or runny nose, no difficulty swallowing  Cardiac:  No chest pain, SOB or edema. No chest pain with exertion.  Respiratory:  No cough or respiratory distress. No hemoptysis. No history of asthma or RAD.  GI:  No nausea, vomiting, diarrhea or abdominal pain.  :  No dysuria, frequency or burning.  MS:  No myalgia, muscle weakness, joint pain or back pain.  Neuro:  No headache or weakness.  No LOC.  Skin:  No skin rash.   Endocrine: No history of thyroid disease or diabetes.

## 2021-07-22 NOTE — PROGRESS NOTE BEHAVIORAL HEALTH - RISK ASSESSMENT
-Suicide risk factors include: depressed mood, interpersonal stress, and past suicidal ideation.   -suicide protective factors include: future orientation, insight into mood disorder, willingness to get help, no prior attempts.  -Low acute risk of suicide   -Elevated chronic risk of suicide <<<IN PROGRESS>>>   -Suicide risk factors include: depressed mood, interpersonal stress, and past suicidal ideation.   -suicide protective factors include: future orientation, insight into mood disorder, willingness to get help, no prior attempts.  -_______ acute risk of suicide   -Elevated chronic risk of suicide -Suicide risk factors include: interpersonal stress, male sex, and being  from wife/daughter (not legally , however)   -suicide protective factors include: future orientation, ongoing therapy, willingness to get help, absence of reported suicidal ideation, no prior suicide attempts.

## 2021-07-22 NOTE — ED CDU PROVIDER INITIAL DAY NOTE - ATTENDING CONTRIBUTION TO CARE
34 y/o M, PMHx HTN, SHAKIR & s/p Gastric Bypass, presents to the ED with complaints of suicidal ideation retracts his statements. pt well appearing, calm, cleared by psych

## 2021-07-22 NOTE — ED PROVIDER NOTE - CARE PLAN
How Severe Is Your Skin Lesion?: moderate
Has Your Skin Lesion Been Treated?: not been treated
Is This A New Presentation, Or A Follow-Up?: Skin Lesion
Principal Discharge DX:	Suicidal ideation  Secondary Diagnosis:	Depression

## 2021-07-22 NOTE — ED BEHAVIORAL HEALTH NOTE - BEHAVIORAL HEALTH NOTE
===================  PRE-HOSPITAL COURSE  ===================  SOURCE: Chart    DETAILS: Patient arrived via EMS activated by sister-in law or ex-wife (unclear report by staff involved on arrival) due to reportedly sending a text that may have been suicidal in nature    ============  ED COURSE   ============  SOURCE: Chart, ED    ARRIVAL: Patient arrived via EMS    BELONGINGS: No items of note    BEHAVIOR: Patient arrived to the ED alert and oriented x3, patient was cooperative with ED medical and safety protocols. Patient reported depression due to relationship issues but denying any active SI/HI, doesn’t report/exhibit psychotic/manic symptoms, thought process/speech WNL. Patient ended up eloping from the ED after several hours in ED, concerned about attending work in AM. Was located by security and brought back.     TREATMENT: No PRN psychiatric medication prior to assessment     VISITORS: None     ========================  COLLATERAL  ========================  NAME: Ernesto    NUMBER: [phone]    RELATIONSHIP: Father    RELIABILITY: Good    COMMENTS:     ========================  HPI  ========================    BASELINE FUNCTIONING: Father states patient lives alone and is employed, is  from spouse with whom he has a child with, the child is in the care of the mother. Father unsure if patient is in any outpatient mental health treatment.     DATE HPI STARTED: Today    DECOMPENSATION: Father was unaware that patient was brought to the ED. States patient has been having relationship issues lately but otherwise has seemed to be at baseline. States he has a good job, carpool with coworkers and sees his daughter often. Reports patient hasn’t mentioned any SI or other concerning symptoms to father, no past SA/SIB, no drug/etoh issues known, no violence hx/access to weapons. Father reports that sister in-law who activated 911 has a personality where she may “jump the gun” in a situation like this so he isn’t surprised she may have activated EMS. Father doesn’t feel patient would ever intentionally injure himself and would likely be safe for d/c.     Father provides phone number for sister Fiorella ([phone]) but there is no answer, states she works nights so may not be available.    VM was left for patient’s sister-in law Citlalli ([phone]) who reportedly activated 911, awaiting return call.     SUICIDALITY: None voiced to father    VIOLENCE: None reported    SUBSTANCE: No issues known    COVID Exposure Screen- collateral (i.e. third-party, chart review, belongings, etc; include EMS and ED staff)  1.	*Has the patient had a COVID-19 test in the last 90 days?  (  ) Yes   (  ) No   ( x ) Unknown- Reason: _____  IF YES PROCEED TO QUESTION #2. IF NO OR UNKNOWN, PLEASE SKIP TO QUESTION #3.  2.	Date of test(s) and result(s): ________  3.	*Has the patient tested positive for COVID-19 antibodies? (  ) Yes   (  ) No   ( x ) Unknown- Reason: _____  IF YES PROCEED TO QUESTION #4. IF NO or UNKNOWN, PLEASE SKIP TO QUESTION #5.  4.	Date of positive antibody test: ________  5.	*Has the patient received 2 doses of the COVID-19 vaccine? (  ) Yes   (  ) No   ( x ) Unknown- Reason: _____  IF YES PROCEED TO QUESTION #6. IF NO or UNKNOWN, PLEASE SKIP TO QUESTION #7.  6.	 Date of second dose: ________  7.	*In the past 10 days, has the patient been around anyone with a positive COVID-19 test?* (  ) Yes   (  ) No   ( x ) Unknown- Reason: __  IF YES PROCEED TO QUESTION #8. IF NO or UNKNOWN, PLEASE SKIP TO QUESTION #13.  8.	Was the patient within 6 feet of them for at least 15 minutes? (  ) Yes   (  ) No   (  ) Unknown- Reason: _____  9.	Did the patient provide care for them? (  ) Yes   (  ) No   (  ) Unknown- Reason: ______  10.	Did the patient have direct physical contact with them (touched, hugged, or kissed them)? (  ) Yes   (  ) No    (  ) Unknown- Reason: __  11.	Did the patient share eating or drinking utensils with them? (  ) Yes   (  ) No    (  ) Unknown- Reason: ____  12.	Did they sneeze, cough, or somehow get respiratory droplets on the patient? (  ) Yes   (  ) No    (  ) Unknown- Reason: ______  13.	*Has the patient been out of [place] within the past 10 days?* (  ) Yes   (  ) No   ( x ) Unknown- Reason: _____  IF YES PLEASE ANSWER THE FOLLOWING QUESTIONS:  14.	Which state/country did they go to? ______  15.	Were they there over 24 hours? (  ) Yes   (  ) No    (  ) Unknown- Reason: ______  16.	Date of return to [facility]: ______

## 2021-07-22 NOTE — ED BEHAVIORAL HEALTH ASSESSMENT NOTE - RISK ASSESSMENT
risk factors: male, , psych dx    protective factors: supportive family, domiciled, employed, no known access to guns/weapons, future-oriented, identifies reason to live Low Acute Suicide Risk

## 2021-07-22 NOTE — ED PROVIDER NOTE - OBJECTIVE STATEMENT
35M pmh htn, [place] on cpap, gastric bypass p/w suicidal ideation. Pt rpts recent feelings of depression regarding separation from wife, texted his sister-in-law this evening that he wanted to kill himself, who called 911. Pt denies specific plan for suicide. Denies HI, AVH. Denies any prior psychiatric history, suicide attempts, or IPP admissions. Denies etoh abuse or illicit drug use. Lives alone, employed, has a daughter with wife.

## 2021-07-22 NOTE — PROGRESS NOTE BEHAVIORAL HEALTH - SUMMARY
<<<IN PROGRESS>>> DOLORES MEEK (MRN-[id number]) is a 35-year-old male; domiciled alone; employed in plumbing and heating; shares custody of 3 yo daughter with ex wife; PPHx of anxiety, depression, prior ER visit in 2009, denies prior hospitalizations, denies hx of SIB/SA; PMHx of bariatric surgery (March 2021), hx sleep apnea (resolved after bariatric surgery), herniated discs, torn meniscus and arthritis of knee; BIB EMS.  Drug screen results are still pending. Patient reportedly sent a text message to sister in law expressing claiming that he wanted to kill himself, but details unclear. Psychiatry consult was placed for mental health evaluation as the team is concerned for suicidal ideation.    Patient was admitted to hospital due to concern for SI due sister in law's reported concerns about patient safety. Patient reported that he was distressed yesterday solely in the context of not seeing his 3 year old daughter as often as he'd like; patient stated that his wife has been keeping daughter from him and that his daughter "needs her father." Patient has repeatedly denied suicidal ideation, intent, and plan since admission. Patient states that he would never kill himself because he needs to be around for his daughter. Patient denies symptoms of depression, affect is not dysphoric; patient is not presenting with depression or cecelia.     Patient presents with low acute risk of suicidality at this time. Patient reports no psychiatric history, is not exhibiting symptoms of depression, patient has been seeing therapist regularly, patient reports no current or past history of suicidal ideation, intent, or plan. Patient repeatedly stated that he could never kill himself because "my daughter needs me. Patient states that he wants to go back to work, and patient denies feelings of hopelessness or worthlessness. Spoke with patient's therapist, Alberto Tripathi, on the phone, who also expressed no concerns about patient safety and that patient was likely being "histrionic" due to concerns about not seeing the daughter.    Plan  - Discontinue 1:1. Patient not at acute risk of harm to self or others at this time.  - Patient does not meet criteria for admission for inpatient psychiatry.  - No psychiatric contraindications to discharge.   - Follow up with outpatient therapist Dr. Alberto Tripathi ([phone]). Appointment reportedly already scheduled for Wednesday next week.     - Signing off DOLORES MEEK is a 35-year-old male; domiciled alone; employed in plumbing and heating; shares custody of 3 yo daughter with ex wife; PPHx of anxiety, depression, prior ER visit in 2009, denies prior hospitalizations, denies hx of SIB/SA; PMHx of bariatric surgery (March 2021), hx sleep apnea (resolved after bariatric surgery), herniated discs, torn meniscus and arthritis of knee; BIB EMS.  Drug screen results are still pending. Patient reportedly sent a text message to sister in law expressing claiming that he wanted to kill himself, but details unclear. Psychiatry consult was placed for mental health evaluation as the team is concerned for suicidal ideation.    Patient was admitted to hospital due to concern for Suicidal ideation due sister in law's reported concerns about patient safety. Patient reported that he was distressed yesterday solely in the context of not seeing his 3 year old daughter as often as he'd like; patient stated that his wife has been keeping daughter from him and that his daughter "needs her father." Patient has repeatedly denied suicidal ideation, intent, and plan since admission. Patient states that he would never kill himself because he needs to be around for his daughter. Patient denies symptoms of depression, affect is not dysphoric; patient is not presenting with depression or cecelia.     Patient presents with low acute risk of suicidality at this time. Patient reports no psychiatric history, is not exhibiting symptoms of depression, patient has been seeing therapist regularly, patient reports no current or past history of suicidal ideation, intent, or plan. Patient repeatedly stated that he could never kill himself because "my daughter needs me. Patient states that he wants to go back to work, and patient denies feelings of hopelessness or worthlessness. Spoke with patient's therapist, Alberto Tripathi, on the phone, who also expressed no concerns about patient safety and that patient was likely being "histrionic" due to concerns about not seeing the daughter.    Plan  - Discontinue 1:1. Patient not at acute risk of harm to self or others at this time.  - Patient does not meet criteria for admission for inpatient psychiatry.  - No psychiatric contraindications to discharge.   - Follow up with outpatient therapist Dr. Alberto Tripathi ([phone]). Appointment reportedly already scheduled for Wednesday next week.     - Signing off DOLORES MEEK is a 35-year-old male; domiciled alone; employed in plumbing and heating; shares custody of 3 yo daughter with ex wife; PPHx of anxiety, depression, prior ER visit in 2009, denies prior hospitalizations, denies hx of SIB/SA; PMHx of bariatric surgery (March 2021), hx sleep apnea (resolved after bariatric surgery), herniated discs, torn meniscus and arthritis of knee; BIB EMS.  Drug screen results are still pending. Patient reportedly sent a text message to sister in law expressing claiming that he wanted to kill himself, but details unclear. Psychiatry consult was placed for mental health evaluation as the team is concerned for suicidal ideation.    Patient was admitted to hospital due to concern for Suicidal ideation due sister in law's reported concerns about patient safety. Patient reported that he was distressed yesterday solely in the context of not seeing his 3 year old daughter as often as he'd like; patient stated that his wife has been keeping daughter from him and that his daughter "needs her father." Patient has repeatedly denied suicidal ideation, intent, and plan since admission. Patient states that he would never kill himself because he needs to be around for his daughter. Patient denies symptoms of depression, affect is not dysphoric; patient is not presenting with depression or cecelia.     Patient presents with low acute risk of suicidality at this time. Patient reports no psychiatric history, is not exhibiting symptoms of depression, patient has been seeing therapist regularly, patient reports no current or past history of suicidal ideation, intent, or plan. Patient repeatedly stated that he could never kill himself because "my daughter needs me. Patient states that he wants to go back to work, and patient denies feelings of hopelessness or worthlessness. Spoke with patient's therapist, Alberto Tripathi, on the phone, who also expressed no concerns about patient safety and that patient was likely being "histrionic" due to concerns about not seeing the daughter.    Plan  - Discontinue 1:1. Patient not at acute risk of harm to self or others at this time.  - Patient does not meet criteria for admission for inpatient psychiatry.  - No psychiatric contraindications to discharge.   - Follow up with outpatient therapist Dr. Alberto Tripathi ([phone]). Appointment reportedly already scheduled for Wednesday next week.     Impression  Adjustment disorder with anxious and depression    r/o depressive disorder    - Signing off

## 2021-07-22 NOTE — PROGRESS NOTE BEHAVIORAL HEALTH - NSBHCONSULTFOLLOWAFTERCARE_PSY_A_CORE FT
- Follow up with outpatient therapist Dr. Tripathi.   - On discharge, can also refer patient to [facility], located at [address] [place] [address], phone number - [phone] (patient can be given [phone] to make appointment themselves).

## 2021-07-22 NOTE — ED CDU PROVIDER DISPOSITION NOTE - NSFOLLOWUPINSTRUCTIONS_ED_ALL_ED_FT
DEPRESSION    WHAT YOU NEED TO KNOW:  Depression is a medical condition that causes feelings of sadness or hopelessness that do not go away. Depression may cause you to lose interest in things you used to enjoy. These feelings may interfere with your daily life.      DISCHARGE INSTRUCTIONS:  Call your local emergency number (911 in the ) if:  You think about harming yourself or someone else.  You have done something on purpose to hurt yourself.  Call your therapist or doctor if:  Your symptoms do not improve.  You cannot make it to your next appointment.  You have new symptoms.  You have questions or concerns about your condition or care.  The following resources are available at any time to help you, if needed:  National Suicide Prevention Lifeline: 1-858.772.3528 (7-128-027-TALK)  Suicide Hotline: 1-973.292.3261 (6-176-JFKAVLE)  For a list of international numbers: https://37[employer].org/find-help/international-resources/  Medicines:  Antidepressants may be given to improve or balance your mood. You may need to take this medicine for several weeks before you begin to feel better.  Take your medicine as directed. Contact your healthcare provider if you think your medicine is not helping or if you have side effects. Tell him of her if you are allergic to any medicine. Keep a list of the medicines, vitamins, and herbs you take. Include the amounts, and when and why you take them. Bring the list or the pill bottles to follow-up visits. Carry your medicine list with you in case of an emergency.  Therapy  is often used together with medicine to relieve depression. Therapy is a way for you to talk about your feelings and anything that may be causing depression. Therapy can be done alone or in a group. It may also be done with family members or a significant other.    Self-care:  Get regular physical activity. Try to be active for 30 minutes, 3 to 5 days a week. Physical activity can help relieve depression. Work with your healthcare provider to develop a plan that you enjoy. It may help to ask someone to be active with you.  Create a regular sleep schedule. A routine can help you relax before bed. Listen to music, read, or do yoga. Try to go to bed and wake up at the same time every day. Sleep is important for emotional health.  Eat a variety of healthy foods. Healthy foods include fruits, vegetables, whole-grain breads, low-fat dairy products, lean meats, fish, and cooked beans. A healthy meal plan is low in fat, salt, and added sugar.  Do not drink alcohol or use drugs. Alcohol and drugs can make depression worse. Talk to your therapist or doctor if you need help quitting.  Follow up with your healthcare provider as directed:  Your healthcare provider will monitor your progress at follow-up visits. He or she will also monitor your medicine if you take antidepressants. Your healthcare provider will ask if the medicine is helping. Tell him or her about any side effects or problems you may have with your medicine. The type or amount of medicine may need to be changed. Write down your questions so you remember to ask them during your visits.    © Copyright [employer] 2021 Information is for End User's use only and may not be sold, redistributed or otherwise used for commercial purposes. All illustrations and images included in CareNotes® are the copyrighted property of [employer].A[employer]M., Inc. or [employer]
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
## 2021-07-22 NOTE — ED ADULT NURSE REASSESSMENT NOTE - NS ED NURSE REASSESS COMMENT FT1
Pt. assessed. Denies HI/SI. No IVL in place. All belongings with security. Pt. in gown. 1:1 in place. Safety prec maintained, will cont to monitor.

## 2021-07-22 NOTE — ED BEHAVIORAL HEALTH ASSESSMENT NOTE - HPI (INCLUDE ILLNESS QUALITY, SEVERITY, DURATION, TIMING, CONTEXT, MODIFYING FACTORS, ASSOCIATED SIGNS AND SYMPTOMS)
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  Pt states that he was feeling sad yesterday and was having a bad day.  He states he is getting  and is only allowed to see his daughter when it's convenient for his ex.  He notes that his family knows he has hx of depression and anxiety and his sister-in-law was concerned so she called police for a wellness check (of note, ED documentation suggests ex-wife activated EMS).  Pt states that he wasn't home at the time (reports he was at a pizza place) and police called him and requested he return home, which he complied with.  Pt reports yesterday was the first time he was sad in a while, as he usually is able to enjoy time with family and at work with coworkers.  Pt denies sleep disturbance, denies anhedonia, denies change in energy level, reports maintaining diet s/p bariatric surgery (fluids, protein shakes, vitamins), denies AVH, denies paranoia, denies manic symptoms.  Pt denies current SI/HI.  Pt denies ever sending a text message to anyone stating that he was going to kill himself.  He cites his daughter as a protective factor and would never do anything to himself to leave her.  Pt admits to attempt to leave the ED prior to telepsych evaluation, stating that he is concerned about getting to work on time as he does not want to lose his job or pay. 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  Pt states that he was feeling sad yesterday and was having a bad day.  He states he is getting  and is only allowed to see his daughter when it's convenient for his ex.  He notes that his family knows he has hx of depression and anxiety and his sister-in-law was concerned so she called police for a wellness check (of note, ED documentation suggests ex-wife activated EMS).  Pt states that he wasn't home at the time (reports he was at a pizza place) and police called him and requested he return home, which he complied with.  Pt reports yesterday was the first time he was sad in a while, as he usually is able to enjoy time with family and at work with coworkers.  Pt denies sleep disturbance, denies anhedonia, denies change in energy level, reports maintaining diet s/p bariatric surgery (fluids, protein shakes, vitamins), denies AVH, denies paranoia, denies manic symptoms.  Pt denies current SI/HI.  Pt denies ever sending a text message to anyone stating that he was going to kill himself.  He cites his daughter as a protective factor and would never do anything to himself to leave her.  Pt admits to attempt to leave the ED prior to telepsych evaluation, stating that he is concerned about getting to work on time as he does not want to lose his job or pay.    Writer attempted to reach pt's sister-in-law ([phone]) but no response.

## 2021-07-22 NOTE — PROGRESS NOTE BEHAVIORAL HEALTH - NSBHCHARTREVIEWLAB_PSY_A_CORE FT
13.4   7.63  )-----------( 258      ( 21 Jul 2021 23:27 )             40.9     07-21    141  |  104  |  11  ----------------------------<  94  4.5   |  30  |  1.0    Ca    9.3      21 Jul 2021 23:27    TPro  7.0  /  Alb  4.3  /  TBili  0.5  /  DBili  x   /  AST  14  /  ALT  10  /  AlkPhos  76  07-21        CAPILLARY BLOOD GLUCOSE

## 2021-07-22 NOTE — ED CDU PROVIDER DISPOSITION NOTE - PATIENT PORTAL LINK FT
You can access the FollowMyHealth Patient Portal offered by [facility] by registering at the following website: [email or web address]. By joining [facility]’s FollowMyHealth portal, you will also be able to view your health information using other applications (apps) compatible with our system.

## 2021-07-26 LAB
EDDP UR QL CFM: 2938 NG/ML — SIGNIFICANT CHANGE UP
EDDP, UR RESULT: 2938 NG/ML — SIGNIFICANT CHANGE UP
METHADONE IN-HOUSE INTERPRETATION: POSITIVE
METHADONE UR CFM-MCNC: POSITIVE

## 2021-08-02 LAB
CARBOXYTHC UR CFM-MCNC: 838 NG/ML — SIGNIFICANT CHANGE UP
CARBOXYTHC UR QL SCN: 222 — SIGNIFICANT CHANGE UP
CARBOXYTHC UR QL SCN: 378.3 MG/DL — SIGNIFICANT CHANGE UP
THC CREATININE URINE: 378.3 MG/DL — SIGNIFICANT CHANGE UP
THC METABOLITE/CREAT URINE: 222 — SIGNIFICANT CHANGE UP

## 2021-10-12 ENCOUNTER — APPOINTMENT (OUTPATIENT)
Dept: SURGERY | Facility: CLINIC | Age: 36
End: 2021-10-12
Payer: COMMERCIAL

## 2021-10-12 VITALS
HEART RATE: 67 BPM | TEMPERATURE: 96.5 F | HEIGHT: 72.5 IN | SYSTOLIC BLOOD PRESSURE: 130 MMHG | OXYGEN SATURATION: 98 % | BODY MASS INDEX: 25.8 KG/M2 | WEIGHT: 192.6 LBS | DIASTOLIC BLOOD PRESSURE: 76 MMHG

## 2021-10-12 DIAGNOSIS — Z86.79 PERSONAL HISTORY OF OTHER DISEASES OF THE CIRCULATORY SYSTEM: ICD-10-CM

## 2021-10-12 DIAGNOSIS — Z98.84 BARIATRIC SURGERY STATUS: ICD-10-CM

## 2021-10-12 DIAGNOSIS — E66.01 MORBID (SEVERE) OBESITY DUE TO EXCESS CALORIES: ICD-10-CM

## 2021-10-12 PROCEDURE — 99212 OFFICE O/P EST SF 10 MIN: CPT

## 2021-10-12 RX ORDER — MULTIVITAMIN
TABLET ORAL DAILY
Refills: 0 | Status: ACTIVE | COMMUNITY

## 2021-10-12 NOTE — HISTORY OF PRESENT ILLNESS
[de-identified] : Patient had surgery approximately 7 months ago. He states that he feels well and remains in therapy which has been very helpful..\par He denies heartburn/reflux/vomiting and food intolerance.\par Depression improved. he has not used his CPAP machine in quite sometime but wakes up refreshed and is not drowsy throughout the day. I discussed the risks of SHAKIR and strongly recommend that he follow up with pulmonary. [Procedure: ___] : Procedure performed: [unfilled]  [Date of Surgery: ___] : Date of Surgery:   [unfilled] [Pre-Op Weight ___] : Pre-op weight was [unfilled] lbs [___ Months Post Op] : [unfilled] months

## 2021-10-12 NOTE — PLAN
[FreeTextEntry1] : Plan: Continue with behavior changes.\par          Follow up with pulmonary.\par          RTO in December.\par          Blood work.\par          Call with concerns.

## 2021-10-12 NOTE — ASSESSMENT
[FreeTextEntry1] : DOLORES MEEK is a 35 year male seen today for bariatric follow up visit. Patient has done extremely well with his weight loss goals and is at 93 % EWL. He has lost a considerable amount of weight but shows no evidence of muscle wasting.\par Patient is compliant with dietary guidelines and he plans his meals. He cooks most of his proteins in the air fryer. Because he starts his day earlier and his job is very active he tries to eat every 2 hours. Breakfast 2 boiled egg and a protein shake 2 hours later. Lunch -  tuna fish/grilled chicken over mixed greens Dinner -  chicken/steak with steamed vegetables and rice. \par Fluid intake is adequate with mainly water and a diet Snapple.\par He goes to the gym 3 days/week but has been focusing mostly on weight bearing exercises. He is a bit disappointed that he still has some hanging skin on his abdomen and is considering plastic surgery in the then next year.

## 2021-10-12 NOTE — REASON FOR VISIT
[Follow-Up Visit] : a follow-up visit for [S/P Bariatric Surgery] : s/p bariatric surgery [FreeTextEntry2] : Sleeve Gastrectomy on 3/8/2021

## 2021-10-12 NOTE — PHYSICAL EXAM
[Normal] : affect appropriate [de-identified] : Soft, nondistended Skin normal color for race, warm, dry and intact. No evidence of rash.

## 2021-10-20 ENCOUNTER — NON-APPOINTMENT (OUTPATIENT)
Age: 36
End: 2021-10-20

## 2021-12-06 ENCOUNTER — NON-APPOINTMENT (OUTPATIENT)
Age: 36
End: 2021-12-06

## 2021-12-21 ENCOUNTER — APPOINTMENT (OUTPATIENT)
Dept: SURGERY | Facility: CLINIC | Age: 36
End: 2021-12-21

## 2022-01-18 ENCOUNTER — APPOINTMENT (OUTPATIENT)
Dept: SURGERY | Facility: CLINIC | Age: 37
End: 2022-01-18
Payer: COMMERCIAL

## 2022-01-18 VITALS
TEMPERATURE: 97.5 F | HEIGHT: 72.5 IN | DIASTOLIC BLOOD PRESSURE: 70 MMHG | OXYGEN SATURATION: 97 % | HEART RATE: 83 BPM | SYSTOLIC BLOOD PRESSURE: 120 MMHG | WEIGHT: 186 LBS | BODY MASS INDEX: 24.92 KG/M2

## 2022-01-18 PROCEDURE — 99212 OFFICE O/P EST SF 10 MIN: CPT

## 2022-01-18 NOTE — PLAN
[FreeTextEntry1] : Plan: Continue with behavioral modifications.\par          Follow up with pulmonary.\par          Blood work.\par          RTO in 3 months.\par          Call with concerns.

## 2022-01-18 NOTE — ASSESSMENT
[FreeTextEntry1] : DOLORES MEEK is a 36 year male seen today for bariatric follow up visit. Patient has done extremely well with his weight loss goals. No evidence of muscle wasting.\par Breakfast - oatmeal around 6 am then 2 egg whites with a slice of swiss cheese a few hours later. Lunch - grilled chicken with steamed vegetables. Dinner -   meatball/loaf with whole wheat pasta\par He rarely snacks.\par Fluid intake is adequate with mostly water and Gatorade Zero.\par He is exercising at the gym 3-4 days/week but is focusing on weight strengthening exercises.

## 2022-01-18 NOTE — HISTORY OF PRESENT ILLNESS
[Pre-Op Weight ___] : Pre-op weight was [unfilled] lbs [de-identified] : Patient had surgery approximately 7 months ago. He states that he feels well and remains in therapy which has been very helpful..\par He denies heartburn/reflux/vomiting and food intolerance.\par Depression improved and he continues with weekly therapy. He has not used his CPAP machine in quite sometime but wakes up refreshed and is not drowsy throughout the day. I discussed the risks of SHAKIR and strongly recommend that he follow up with pulmonary. \par Patient is interested in plastic surgery as his loose skin is impacting his quality of life.

## 2022-02-03 ENCOUNTER — NON-APPOINTMENT (OUTPATIENT)
Age: 37
End: 2022-02-03

## 2022-02-04 ENCOUNTER — EMERGENCY (EMERGENCY)
Facility: HOSPITAL | Age: 37
LOS: 0 days | Discharge: HOME | End: 2022-02-04
Attending: EMERGENCY MEDICINE | Admitting: EMERGENCY MEDICINE
Payer: COMMERCIAL

## 2022-02-04 VITALS
SYSTOLIC BLOOD PRESSURE: 131 MMHG | HEIGHT: 73 IN | OXYGEN SATURATION: 100 % | DIASTOLIC BLOOD PRESSURE: 78 MMHG | RESPIRATION RATE: 17 BRPM | HEART RATE: 68 BPM | TEMPERATURE: 97 F

## 2022-02-04 DIAGNOSIS — Z98.84 BARIATRIC SURGERY STATUS: Chronic | ICD-10-CM

## 2022-02-04 DIAGNOSIS — Z87.81 PERSONAL HISTORY OF (HEALED) TRAUMATIC FRACTURE: Chronic | ICD-10-CM

## 2022-02-04 DIAGNOSIS — M25.511 PAIN IN RIGHT SHOULDER: ICD-10-CM
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
## 2022-02-04 DIAGNOSIS — M54.9 DORSALGIA, UNSPECIFIED: ICD-10-CM

## 2022-02-04 DIAGNOSIS — M25.562 PAIN IN LEFT KNEE: ICD-10-CM

## 2022-02-04 DIAGNOSIS — M54.2 CERVICALGIA: ICD-10-CM
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
## 2022-02-04 DIAGNOSIS — F17.200 NICOTINE DEPENDENCE, UNSPECIFIED, UNCOMPLICATED: ICD-10-CM

## 2022-02-04 PROCEDURE — 99283 EMERGENCY DEPT VISIT LOW MDM: CPT
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
## 2022-02-04 NOTE — ED PROVIDER NOTE - NSICDXPASTSURGICALHX_GEN_ALL_CORE_FT
PAST SURGICAL HISTORY:  History of gastric bypass sx in march 21    Status post fracture of left tibia 5/1999
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
## 2022-02-04 NOTE — ED PROVIDER NOTE - NSFOLLOWUPCLINICS_GEN_ALL_ED_FT
[facility]  Outpatient Clinic  [address], [place]   Phone: [phone]  Fax:   Follow Up Time: Routine

## 2022-02-04 NOTE — ED PROVIDER NOTE - PATIENT PORTAL LINK FT
You can access the FollowMyHealth Patient Portal offered by [facility] by registering at the following website: [email or web address]. By joining [facility] FollowMyHealth portal, you will also be able to view your health information using other applications (apps) compatible with our system.

## 2022-02-04 NOTE — ED PROVIDER NOTE - NS ED ROS FT
Constitutional:  See HPI.   Eyes:  No visual changes, eye pain or discharge.  ENMT:  No hearing changes, pain, discharge or infections. No neck pain or stiffness.  Cardiac:  No chest pain, SOB or edema. No chest pain with exertion.  Respiratory:  No cough or respiratory distress. No hemoptysis.  GI:  No nausea, vomiting, diarrhea, abdominal pain.  :  No dysuria, frequency, hematuria  MS:  No joint pain. +back pain. +L knee pain  Neuro:  No LOC. No headache or weakness.    Skin:  No skin rash.  Except as in HPI, all other review of systems is negative

## 2022-02-04 NOTE — ED PROVIDER NOTE - NSICDXPASTMEDICALHX_GEN_ALL_CORE_FT
PAST MEDICAL HISTORY:  Bilateral knee pain     Lower back pain     Obesity BMI >40    Obstructive sleep apnea on CPAP

## 2022-02-04 NOTE — ED PROVIDER NOTE - PHYSICAL EXAMINATION
CONSTITUTIONAL: Well-appearing; well-nourished; in no apparent distress.   HEAD: Normocephalic; atraumatic. no ecchymosis, no raccoon eyes, no mai sign  EYES: PERRL; EOM intact. Conjunctiva normal B/L.   ENT: Normal pharynx with no tonsillar hypertrophy. MMM.  NECK: Supple; non-tender; no cervical lymphadenopathy.   CHEST: Normal chest excursion with respiration.   CARDIOVASCULAR: Normal S1, S2; no murmurs, rubs, or gallops.   RESPIRATORY: Normal chest excursion with respiration; breath sounds clear and equal bilaterally; no wheezes, rhonchi, or rales.  GI/: Normal bowel sounds; non-distended; non-tender.  BACK: No evidence of trauma or deformity. Non-tender to palpation. No CVA tenderness. paraspinal tenderness ~L2-4, T4.   EXT: Normal ROM in all four extremities; non-tender to palpation; distal pulses are normal. No leg edema B/L. b/l knees nonedematous, nonerythematous, no bony deformities  SKIN: Normal for age and race; warm; dry; good turgor.  NEURO: A & O x 4; CN 2-12 intact. Grossly unremarkable.

## 2022-02-04 NOTE — ED PROVIDER NOTE - NSICDXFAMILYHX_GEN_ALL_CORE_FT
FAMILY HISTORY:  Father  Still living? Unknown  FH: asthma, Age at diagnosis: Age Unknown    Mother  Still living? Unknown  FH: CAD (coronary artery disease), Age at diagnosis: Age Unknown
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
## 2022-02-07 NOTE — ED ADULT NURSE NOTE - NSTOBACCONEVERSMOKERY/N_GEN_A

## 2022-02-15 ENCOUNTER — NON-APPOINTMENT (OUTPATIENT)
Age: 37
End: 2022-02-15

## 2022-04-22 ENCOUNTER — APPOINTMENT (OUTPATIENT)
Dept: PLASTIC SURGERY | Facility: CLINIC | Age: 37
End: 2022-04-22
Payer: COMMERCIAL

## 2022-04-22 VITALS — WEIGHT: 184 LBS | HEIGHT: 73 IN | BODY MASS INDEX: 24.39 KG/M2

## 2022-04-22 PROCEDURE — 99203 OFFICE O/P NEW LOW 30 MIN: CPT

## 2022-04-22 NOTE — ASSESSMENT
[FreeTextEntry1] : 35 yo M s/p lap gastric sleeve with 150 lbs weight loss, weight stable 8 months, now with hanging abdominal pannus excess skin with associated rash.\par \par He is a good candidate for panniculectomy\par \par -Regarding the procedure, the discussed the benefits, risks, and outcomes. I explained the risk of bleeding, infection, hematoma, seroma, poor wound healing, wound separation, fat and/or tissue necrosis, hypertrophic scar/keloid formation, umbilicus ischemia, risk of VTE and possible pulmonary embolism, and need for re-admission, and dissatisfaction with the outcome. I also discussed the anticipated scar locations (hip-to-hip; periumbilical), use of surgical drains, and need for post-operative use of an abdominal binder and lifting restrictions after surgery (6 weeks).\par -Counseled smoking cessation before surgery x 6 weeks.  Reviewed increased risk of wound healing issues with smoking.  Pt agreed.  He understands that urine nicotine will be performed and case may be cancelled postponed for (+) test.\par -The patient understand the risks and post-operative expectations.\par -All the patient's questions were answered to her apparent satisfaction. Informed consent was obtained.\par -Pre-op photos were taken\par -Will schedule for outpatient OBDULIO procedure.  Pt requesting after August 2/2 work commitments\par \par Due to COVID-19, pre-visit patient instructions were explained to the patient and their symptoms were checked upon arrival. Masks were used by the healthcare provider and staff and the examination room was cleaned after the patient visit concluded\par \par

## 2022-04-22 NOTE — PHYSICAL EXAM
[de-identified] : well-appearing, NAD [de-identified] : nonlabored breathing [de-identified] : Well healing gastric sleeve incisions.  Excess deflated hanging abdominal tissue with underlying rash (vertical excess).  No obvious hernias palpated.

## 2022-04-22 NOTE — HISTORY OF PRESENT ILLNESS
[FreeTextEntry1] : 36yoM Hx of s/p lap gastric sleeve in 3/2021 w/ >150 lb weight loss  (heaviest 340 currently 185lbs)  presents with concerns of excess skin in his abdomen.  Pt reports that his weight has been stable since August 2021 and he is happy with his current weight not trying to lose any additional weight. Pt has been followed by his bariatric surgeon (Dr. Roque) and was referred to discuss additional options.\par \par Pt reports that he has had recurrent rashes below his hanging abdominal skin that are bothersome and painful to him.\par \par No DM\par Smokes 1/2 PPD x 10 years but had to previously quit for his gastric sleeve \par Pt okay to quit prior to this procedure as well.  \par \par Exercising at gym 3x/week.  weight stable\par \par

## 2022-04-26 ENCOUNTER — APPOINTMENT (OUTPATIENT)
Dept: SURGERY | Facility: CLINIC | Age: 37
End: 2022-04-26
Payer: COMMERCIAL

## 2022-04-26 VITALS — WEIGHT: 179 LBS | BODY MASS INDEX: 23.72 KG/M2 | HEIGHT: 73 IN

## 2022-04-26 DIAGNOSIS — Z98.84 BARIATRIC SURGERY STATUS: ICD-10-CM

## 2022-04-26 PROCEDURE — 99446 NTRPROF PH1/NTRNET/EHR 5-10: CPT

## 2022-04-26 PROCEDURE — G2012 BRIEF CHECK IN BY MD/QHP: CPT

## 2022-04-26 RX ORDER — PNV NO.95/FERROUS FUM/FOLIC AC 28MG-0.8MG
TABLET ORAL DAILY
Refills: 0 | Status: ACTIVE | COMMUNITY

## 2022-04-26 RX ORDER — CALCIUM CARBONATE/VITAMIN D3 500 MG-2.5
TABLET,CHEWABLE ORAL DAILY
Refills: 0 | Status: ACTIVE | COMMUNITY

## 2022-04-26 RX ORDER — CHROMIUM 200 MCG
TABLET ORAL DAILY
Refills: 0 | Status: ACTIVE | COMMUNITY

## 2022-04-26 NOTE — PLAN
[FreeTextEntry1] : Plan: Continue with behavioral changes.\par          Blood work is up to date.\par          RTO in 6 months.\par          Call with concerns.\par
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
## 2022-04-26 NOTE — ASSESSMENT
[FreeTextEntry1] : DOLORES MEEK is a 36 year male seen today for bariatric follow up visit. Patient has done extremely well with his weight loss goals.\par Breakfast - hard boiled eggs, Greek yogurt or a protein bar. Lunch - grilled chicken with steamed vegetables or tuna fish on a slice of toast. Dinner - meatball/loaf with whole wheat pasta or grilled chicken with sir fried vegetable medley. He added bananas, tangerines, grapes and berries to his dietary regimen as well.\par Fluid intake is adequate with mostly water, diet Snapple and Gatorade Zero.\par He is exercising at the gym 3 days/week but is focusing on weight strengthening exercises. \par \par  \par

## 2022-05-02 ENCOUNTER — APPOINTMENT (OUTPATIENT)
Age: 37
End: 2022-05-02
Payer: COMMERCIAL

## 2022-05-02 VITALS
SYSTOLIC BLOOD PRESSURE: 120 MMHG | WEIGHT: 187 LBS | OXYGEN SATURATION: 99 % | DIASTOLIC BLOOD PRESSURE: 80 MMHG | HEART RATE: 62 BPM | RESPIRATION RATE: 12 BRPM | HEIGHT: 73 IN | BODY MASS INDEX: 24.78 KG/M2

## 2022-05-02 DIAGNOSIS — G47.33 OBSTRUCTIVE SLEEP APNEA (ADULT) (PEDIATRIC): ICD-10-CM

## 2022-05-02 PROCEDURE — 71046 X-RAY EXAM CHEST 2 VIEWS: CPT

## 2022-05-02 PROCEDURE — 99214 OFFICE O/P EST MOD 30 MIN: CPT | Mod: 25

## 2022-05-02 NOTE — PROCEDURE
[FreeTextEntry1] : CXR PA and Lateral \par The costophrenic and cardiophrenic angles are sharp\par The eric parenchyma shows no infiltrates, consolidations, or nodules.  Upper lobe density seen on Lateral view  \par The Mediastinum is within normal limits\par No pleural effusions\par

## 2022-08-31 ENCOUNTER — APPOINTMENT (OUTPATIENT)
Dept: PLASTIC SURGERY | Facility: CLINIC | Age: 37
End: 2022-08-31

## 2022-09-22 ENCOUNTER — APPOINTMENT (OUTPATIENT)
Dept: PLASTIC SURGERY | Facility: AMBULATORY SURGERY CENTER | Age: 37
End: 2022-09-22

## 2022-09-29 ENCOUNTER — APPOINTMENT (OUTPATIENT)
Dept: PLASTIC SURGERY | Facility: CLINIC | Age: 37
End: 2022-09-29

## 2022-11-18 ENCOUNTER — APPOINTMENT (OUTPATIENT)
Age: 37
End: 2022-11-18

## 2023-04-24 ENCOUNTER — APPOINTMENT (OUTPATIENT)
Dept: SURGERY | Facility: CLINIC | Age: 38
End: 2023-04-24

## 2023-07-28 ENCOUNTER — OUTPATIENT (OUTPATIENT)
Dept: OUTPATIENT SERVICES | Facility: HOSPITAL | Age: 38
LOS: 1 days | End: 2023-07-28
Payer: COMMERCIAL

## 2023-07-28 DIAGNOSIS — K02.9 DENTAL CARIES, UNSPECIFIED: ICD-10-CM

## 2023-07-28 DIAGNOSIS — Z98.84 BARIATRIC SURGERY STATUS: Chronic | ICD-10-CM

## 2023-07-28 DIAGNOSIS — Z87.81 PERSONAL HISTORY OF (HEALED) TRAUMATIC FRACTURE: Chronic | ICD-10-CM

## 2023-07-28 PROCEDURE — D0230: CPT

## 2023-07-28 PROCEDURE — D0220: CPT

## 2023-07-28 PROCEDURE — D0140: CPT

## 2023-08-02 DIAGNOSIS — K02.9 DENTAL CARIES, UNSPECIFIED: ICD-10-CM

## 2023-10-16 ENCOUNTER — APPOINTMENT (OUTPATIENT)
Dept: SURGERY | Facility: CLINIC | Age: 38
End: 2023-10-16

## 2024-07-26 ENCOUNTER — APPOINTMENT (OUTPATIENT)
Dept: PLASTIC SURGERY | Facility: CLINIC | Age: 39
End: 2024-07-26
Payer: COMMERCIAL

## 2024-07-26 PROCEDURE — 99213 OFFICE O/P EST LOW 20 MIN: CPT

## 2024-07-26 NOTE — ASSESSMENT
[FreeTextEntry1] : 39 yo M s/p lap gastric sleeve with 150 lbs weight loss, weight stable 8 months, now with hanging abdominal pannus excess skin with associated rash.  He is a good candidate for panniculectomy  -Regarding the procedure, the discussed the benefits, risks, and outcomes. I explained the risk of bleeding, infection, hematoma, seroma, poor wound healing, wound separation, fat and/or tissue necrosis, hypertrophic scar/keloid formation, umbilicus ischemia, risk of VTE and possible pulmonary embolism, and need for re-admission, and dissatisfaction with the outcome. I also discussed the anticipated scar locations (hip-to-hip; periumbilical), use of surgical drains, and need for post-operative use of an abdominal binder and lifting restrictions after surgery (6 weeks). -Counseled smoking cessation before surgery x 6 weeks.  Reviewed increased risk of wound healing issues with smoking.  Pt agreed.  He understands that urine nicotine will be performed and case may be cancelled postponed for (+) test. -The patient understands the risks and post-operative expectations. -All the patient's questions were answered to her apparent satisfaction. Informed consent was obtained. -Pre-op photos were taken -Will schedule for outpatient OBDULIO procedure.  Pt requesting January 2025 due to vacation time from work.

## 2024-07-26 NOTE — HISTORY OF PRESENT ILLNESS
[FreeTextEntry1] : 36yoM Hx of s/p lap gastric sleeve in 3/2021 w/ >150 lb weight loss  (heaviest 340 currently 185lbs)  presents with concerns of excess skin in his abdomen.  Pt reports that his weight has been stable since August 2021 and he is happy with his current weight not trying to lose any additional weight. Pt has been followed by his bariatric surgeon (Dr. Roque) and was referred to discuss additional options.  Pt reports that he has had recurrent rashes below his hanging abdominal skin that are bothersome and painful to him.  No DM Smokes 1/2 PPD x 10 years but had to previously quit for his gastric sleeve  Pt okay to quit prior to this procedure as well.    Exercising at gym 3x/week.  weight stable  Interval hx (7/26/24): Pt is here for follow up regarding panniculectomy. He was previously approved in 2022 but he became busy with work and was unable to proceed at that time. He is now interested in proceeding with panniculectomy. Denies any changes since prior visit. He is weight stable at 172 lbs. He smokes 4-5 cigarettes per day but is motivated to quit atleast 6 weeks in advance of surgery. He does not require VNS for drain management post-operatively.

## 2024-07-26 NOTE — PHYSICAL EXAM
[de-identified] : well-appearing, NAD [de-identified] : nonlabored breathing [de-identified] : Well healing gastric sleeve incisions.  Excess deflated hanging abdominal tissue with underlying rash (vertical excess).  No obvious hernias palpated.

## 2024-09-11 ENCOUNTER — TELEPHONE (OUTPATIENT)
Age: 39
End: 2024-09-11

## 2024-09-11 NOTE — TELEPHONE ENCOUNTER
Patient calling as a new patient to schedule an appointment, patient has [employer] insurance. Advised that we do not take this insurance.

## 2024-10-31 ENCOUNTER — TELEPHONE (OUTPATIENT)
Age: 39
End: 2024-10-31

## 2024-10-31 NOTE — TELEPHONE ENCOUNTER
Patient called to establish care and is scheduled on 11/6 at 11:30 AM with Dr Neville for erectile dysfunction. Patient requested soonest appt avail and works close to [place].     Patient used to see Urologist Dr Russell Henson in [place] for this. Pt is looking get Rx to manage this.    Pt does have a PCP but does not know the name.

## 2024-11-06 ENCOUNTER — OFFICE VISIT (OUTPATIENT)
Dept: UROLOGY | Facility: MEDICAL CENTER | Age: 39
End: 2024-11-06
Payer: COMMERCIAL

## 2024-11-06 VITALS
DIASTOLIC BLOOD PRESSURE: 70 MMHG | BODY MASS INDEX: 23.72 KG/M2 | HEIGHT: 73 IN | OXYGEN SATURATION: 96 % | SYSTOLIC BLOOD PRESSURE: 120 MMHG | WEIGHT: 179 LBS | HEART RATE: 89 BPM

## 2024-11-06 DIAGNOSIS — N52.8 OTHER MALE ERECTILE DYSFUNCTION: Primary | ICD-10-CM

## 2024-11-06 PROCEDURE — 99203 OFFICE O/P NEW LOW 30 MIN: CPT | Performed by: UROLOGY

## 2024-11-06 RX ORDER — GABAPENTIN 300 MG/1
300 CAPSULE ORAL 3 TIMES DAILY
COMMUNITY
Start: 2023-12-27 | End: 2024-12-26

## 2024-11-06 RX ORDER — SILDENAFIL 100 MG/1
100 TABLET, FILM COATED ORAL DAILY PRN
COMMUNITY
Start: 2024-10-31 | End: 2024-11-06 | Stop reason: SDUPTHER

## 2024-11-06 RX ORDER — MULTIVITAMIN
1 TABLET ORAL DAILY
COMMUNITY

## 2024-11-06 RX ORDER — FERROUS SULFATE 325(65) MG
1 TABLET, DELAYED RELEASE (ENTERIC COATED) ORAL
COMMUNITY
Start: 2024-01-15 | End: 2025-01-14

## 2024-11-06 RX ORDER — SILDENAFIL 100 MG/1
100 TABLET, FILM COATED ORAL DAILY PRN
Qty: 90 TABLET | Refills: 0 | Status: SHIPPED | OUTPATIENT
Start: 2024-11-06

## 2024-11-06 NOTE — PROGRESS NOTES
11/6/2024    Jeyson Minor Santanaferdinandalthea  1985  [id number]    1. Other male erectile dysfunction  Assessment & Plan:  ED x 10 years  History of morbid obesity s/p gastric sleeve in 2/2021, now normal BMI  Current smoker  Reports normal testosterone levels  - continue sildenafil 100 mg PRN, reports medication is effective with no side-effects, refills sent  - smoking cessation recommended  - diet and exercise to promote cardiovascular health  Orders:  -     sildenafil (VIAGRA) 100 mg tablet; Take 1 tablet (100 mg total) by mouth daily as needed for erectile dysfunction       History of Present Illness  38 y.o. adult with a history of obesity s/p gastric sleeve in 3/2021 who presents with ED    Patient reports increased difficulty both getting and maintaining erections for the past 10 years    HgA1c in 12/2023 was 5.3      Risk factors for ED:  No DM  No HTN  No Hchol  + history of obesity (340 lbs) s/p gastric sleeve, now 180 lbs  + smoking history (1/2 pack x 19 years)  No alcohol use  No prior pelvic surgery  No prior pelvic radiation    Prior treatments for ED: taking sildenafil 100 mg PRN for ED   Reports that sildenafil is effective for his ED and denies any negative side-effects  Just moved from [facility] and needs new script for sildenafil   Reports normal testosterone levels with old urologist     No cardiac history: No MI, no CVA  Does not use nitrates     Denies any urinary issues  No family history of prostate cancer        AUA Symptom Score  AUA SYMPTOM SCORE      Flowsheet Row Most Recent Value   AUA SYMPTOM SCORE    How often have you had a sensation of not emptying your bladder completely after you finished urinating? 0 (P)     How often have you had to urinate again less than two hours after you finished urinating? 1 (P)     How often have you found you stopped and started again several times when you urinate? 0 (P)     How often have you found it difficult to postpone urination? 0 (P)     How often have you  had a weak urinary stream? 0 (P)     How often have you had to push or strain to begin urination? 0 (P)     How many times did you most typically get up to urinate from the time you went to bed at night until the time you got up in the morning? 0 (P)     Quality of Life: If you were to spend the rest of your life with your urinary condition just the way it is now, how would you feel about that? 0 (P)     AUA SYMPTOM SCORE 1 (P)              Review of Systems   All other systems reviewed and are negative.      Past Medical History  History reviewed. No pertinent past medical history.    Past Social History  History reviewed. No pertinent surgical history.    Past Family History  History reviewed. No pertinent family history.    Past Social history  Social History     Socioeconomic History    Marital status: /Civil Union     Spouse name: Not on file    Number of children: Not on file    Years of education: Not on file    Highest education level: Not on file   Occupational History    Not on file   Tobacco Use    Smoking status: Every Day     Types: Cigarettes    Smokeless tobacco: Never   Substance and Sexual Activity    Alcohol use: Never    Drug use: Never    Sexual activity: Not on file   Other Topics Concern    Not on file   Social History Narrative    Not on file     Social Determinants of Health     Financial Resource Strain: Low Risk  (12/12/2023)    Received from [employer] [facility]    Financial Resource Strain     Do you have any trouble paying for your medications, or do you think you might in the future?: No     Does your family have trouble paying for medicine? (Household - for ages 0-17 years): Not on file   Food Insecurity: Food Insecurity Present (12/12/2023)    Received from [employer]    Hunger Vital Sign     Worried About Running Out of Food in the Last Year: Often true     Ran Out of Food in the Last Year: Often true   Transportation Needs: No Transportation Needs (12/12/2023)    Received from [employer]     Transportation Needs     READ ONLY Do you have trouble getting a ride to medical visits or work?: Never True     Does your family have a hard time getting a ride to doctors’ visits? (Household - for ages 0-17 years): Not on file     Has lack of transportation kept you from medical appointments, meetings, work, or from getting things needed for daily living? Check all that apply. (Adult - for ages 18 years and over): Not on file     Do you (or your family) have trouble finding or paying for a ride (transportation)? (Household - for ages 0-17 years): Not on file   Physical Activity: Not on file   Stress: Not on file   Social Connections: Socially Integrated (12/12/2023)    Received from [employer]     How often do you feel lonely or isolated from those around you?: Sometimes   Intimate Partner Violence: Not on file   Housing Stability: Low Risk  (12/12/2023)    Received from [employer] [facility]     Do you currently live in a shelter or have no steady place to sleep at night?: No     READ ONLY Do you think you are at risk of becoming homeless?: No     Does your family worry about paying for your home or becoming homeless? (Household - for ages 0-17 years): Not on file     Are you homeless or worried that you might be in the future? (Adult - for ages 18 years and over): Not on file     Are you (or your family) homeless or worried that you might be in the future? (Household - for ages 0-17 years): Not on file       Current Medications  Current Outpatient Medications   Medication Sig Dispense Refill    ferrous sulfate 325 (65 FE) MG EC tablet Take 1 tablet by mouth daily with breakfast      gabapentin (NEURONTIN) 300 mg capsule Take 300 mg by mouth Three times a day      Multiple Vitamin (multivitamin) tablet Take 1 tablet by mouth daily      sildenafil (VIAGRA) 100 mg tablet Take 1 tablet (100 mg total) by mouth daily as needed for erectile dysfunction 90 tablet 0     No current  "facility-administered medications for this visit.       Allergies  No Known Allergies    Past Medical History, Social History, Family History, medications and allergies were reviewed.    Vitals  Vitals:    11/06/24 1340   BP: 120/70   BP Location: Left arm   Patient Position: Sitting   Cuff Size: Adult   Pulse: 89   SpO2: 96%   Weight: 81.2 kg (179 lb)   Height: 6' 1\" (1.854 m)       Physical Exam  Constitutional:       Appearance: Normal appearance. He is normal weight.   HENT:      Head: Normocephalic.      Nose: Nose normal. No congestion.   Eyes:      Conjunctiva/sclera: Conjunctivae normal.   Cardiovascular:      Rate and Rhythm: Normal rate.   Pulmonary:      Effort: Pulmonary effort is normal. No respiratory distress.   Abdominal:      General: Abdomen is flat. There is no distension.      Palpations: Abdomen is soft.      Tenderness: There is no right CVA tenderness or left CVA tenderness.   Musculoskeletal:      Comments: Normal gait   Skin:     General: Skin is warm and dry.   Neurological:      General: No focal deficit present.      Mental Status: He is alert and oriented to person, place, and time.   Psychiatric:         Mood and Affect: Mood normal.         Results  No results found for: \"PSA\"  Lab Results   Component Value Date    CALCIUM 8.6 12/29/2023    K 4.5 12/29/2023    CO2 32 (H) 12/29/2023     12/29/2023    BUN 10 12/29/2023    CREATININE 1.06 12/29/2023     No results found for: \"WBC\", \"HGB\", \"HCT\", \"MCV\", \"PLT\"    Office Urine Dip  No results found for this or any previous visit (from the past 1 hour(s)).]    "

## 2024-11-06 NOTE — ASSESSMENT & PLAN NOTE
ED x 10 years  History of morbid obesity s/p gastric sleeve in 2/2021, now normal BMI  Current smoker  Reports normal testosterone levels  - continue sildenafil 100 mg PRN, reports medication is effective with no side-effects, refills sent  - smoking cessation recommended  - diet and exercise to promote cardiovascular health

## 2024-12-12 ENCOUNTER — OFFICE VISIT (OUTPATIENT)
Dept: FAMILY MEDICINE CLINIC | Facility: CLINIC | Age: 39
End: 2024-12-12
Payer: COMMERCIAL

## 2024-12-12 VITALS
OXYGEN SATURATION: 98 % | HEIGHT: 73 IN | RESPIRATION RATE: 18 BRPM | BODY MASS INDEX: 24.92 KG/M2 | HEART RATE: 78 BPM | WEIGHT: 188 LBS | SYSTOLIC BLOOD PRESSURE: 130 MMHG | DIASTOLIC BLOOD PRESSURE: 90 MMHG

## 2024-12-12 DIAGNOSIS — F17.210 CIGARETTE SMOKER: ICD-10-CM

## 2024-12-12 DIAGNOSIS — N52.8 OTHER MALE ERECTILE DYSFUNCTION: ICD-10-CM

## 2024-12-12 DIAGNOSIS — Z11.59 NEED FOR HEPATITIS C SCREENING TEST: ICD-10-CM

## 2024-12-12 DIAGNOSIS — Z98.84 S/P GASTRIC BYPASS: Primary | ICD-10-CM

## 2024-12-12 DIAGNOSIS — F33.1 MODERATE EPISODE OF RECURRENT MAJOR DEPRESSIVE DISORDER (HCC): ICD-10-CM

## 2024-12-12 DIAGNOSIS — Z11.4 SCREENING FOR HIV (HUMAN IMMUNODEFICIENCY VIRUS): ICD-10-CM

## 2024-12-12 DIAGNOSIS — R45.1 RESTLESS UPPER EXTREMITY: ICD-10-CM

## 2024-12-12 DIAGNOSIS — F32.2 SEVERE MAJOR DEPRESSIVE DISORDER (HCC): ICD-10-CM

## 2024-12-12 PROCEDURE — 99204 OFFICE O/P NEW MOD 45 MIN: CPT | Performed by: NURSE PRACTITIONER

## 2024-12-12 RX ORDER — TRAZODONE HYDROCHLORIDE 50 MG/1
50 TABLET, FILM COATED ORAL
Qty: 30 TABLET | Refills: 1 | Status: SHIPPED | OUTPATIENT
Start: 2024-12-12 | End: 2024-12-19 | Stop reason: SINTOL

## 2024-12-12 NOTE — ASSESSMENT & PLAN NOTE
Depression Screening Follow-up Plan: Patient's depression screening was positive with a PHQ-2 score of 4. Their PHQ-9 score was 18. Patient assessed for underlying major depression. They have no active suicidal ideations. Brief counseling provided and recommend additional follow-up/re-evaluation next office visit.  Provided referral to psychiatry for further evaluation.  To begin trazodone 50 mg at bedtime.

## 2024-12-12 NOTE — PROGRESS NOTES
Name: Jeyson Covington      : 1985      MRN: [id number]  Encounter Provider: DEE Rodriguez  Encounter Date: 2024   Encounter department: [facility] [address]  :  Assessment & Plan  S/P gastric bypass  To obtain labs, will call with results.  Orders:    CBC and differential; Future    Comprehensive metabolic panel; Future    Lipid Panel with Direct LDL reflex; Future    TSH, 3rd generation with Free T4 reflex; Future    Vitamin B12; Future    Vitamin D 25 hydroxy; Future    Magnesium; Future    Iron Panel (Includes Ferritin, Iron Sat%, Iron, and TIBC); Future    UA (URINE) with reflex to Scope; Future    Severe major depressive disorder (HCC)  Depression Screening Follow-up Plan: Patient's depression screening was positive with a PHQ-2 score of 4. Their PHQ-9 score was 18. Patient assessed for underlying major depression. They have no active suicidal ideations. Brief counseling provided and recommend additional follow-up/re-evaluation next office visit.  Provided referral to psychiatry for further evaluation.  To begin trazodone 50 mg at bedtime.       Restless upper extremity  Tried gabapentin briefly but did not feel as this was helpful.  Counseled on increasing water intake and significant daily limiting nicotine and caffeine as this can exacerbate symptoms.  Advised to obtain iron panel and magnesium.       Cigarette smoker  Counseled on the importance of smoking cessation and the negative health effects of tobacco abuse.         Other male erectile dysfunction  To continue Viagra 100 mg as needed.       Need for hepatitis C screening test    Orders:    Hepatitis C Antibody; Future    Screening for HIV (human immunodeficiency virus)    Orders:    HIV 1/2 AG/AB w Reflex [facility] for 2 yr old and above; Future           History of Present Illness     Jeyson presents for an initial visit.  He has a past medical history of anxiety, depression, opioid abuse,  "and erectile dysfunction.   He has a past surgical history of gastric sleeve in 2021, tobacco abuse, shoulder surgery, and tibia fracture.  He has lost about 170 pounds since his gastric sleeve.  Since this time, Jeyson reports cold intolerance and fatigue.  He is due for lab work.  He also reports concern regarding a restless sensation in his upper extremities.  This started a year and a half ago after stopping Percocet.  Since this time, he has been using an over-the-counter product called kratom.  He is unable to sleep without it.  His prior PCP started him on gabapentin.  Jeyson took this briefly but did not find it helpful.  He smokes about a pack a day and drinks a large amount of coffee throughout the day.      Review of Systems   Constitutional:  Positive for fatigue.   HENT: Negative.     Eyes: Negative.    Respiratory: Negative.     Cardiovascular: Negative.    Gastrointestinal:  Positive for constipation.   Endocrine: Positive for cold intolerance.   Genitourinary: Negative.    Musculoskeletal: Negative.    Skin: Negative.    Allergic/Immunologic: Negative.    Neurological:         Restless upper arms   Hematological: Negative.    Psychiatric/Behavioral:  Positive for dysphoric mood and sleep disturbance. Negative for self-injury and suicidal ideas. The patient is nervous/anxious.        Objective   /90 (BP Location: Left arm, Patient Position: Sitting, Cuff Size: Large)   Pulse 78   Resp 18   Ht 6' 0.5\" (1.842 m)   Wt 85.3 kg (188 lb)   SpO2 98%   BMI 25.15 kg/m²      Physical Exam  Vitals and nursing note reviewed.   Constitutional:       General: He is not in acute distress.     Appearance: Normal appearance. He is well-developed.   HENT:      Head: Normocephalic and atraumatic.      Right Ear: Tympanic membrane, ear canal and external ear normal.      Left Ear: Tympanic membrane, ear canal and external ear normal.      Nose: Nose normal.      Mouth/Throat:      Mouth: Mucous membranes are " moist.      Pharynx: Oropharynx is clear. No oropharyngeal exudate.   Eyes:      General: Lids are normal.      Conjunctiva/sclera: Conjunctivae normal.      Pupils: Pupils are equal, round, and reactive to light.   Cardiovascular:      Rate and Rhythm: Normal rate and regular rhythm.      Heart sounds: Normal heart sounds. No murmur heard.  Pulmonary:      Effort: Pulmonary effort is normal. No respiratory distress.      Breath sounds: Normal breath sounds. No stridor. No wheezing, rhonchi or rales.   Chest:      Chest wall: No tenderness.   Abdominal:      General: Bowel sounds are normal. There is no distension.      Palpations: Abdomen is soft. There is no mass.      Tenderness: There is no abdominal tenderness. There is no guarding or rebound.      Hernia: No hernia is present.   Musculoskeletal:         General: No deformity. Normal range of motion.      Cervical back: Normal range of motion and neck supple.   Lymphadenopathy:      Cervical: No cervical adenopathy.   Skin:     General: Skin is warm and dry.      Capillary Refill: Capillary refill takes less than 2 seconds.   Neurological:      General: No focal deficit present.      Mental Status: He is alert and oriented to person, place, and time.   Psychiatric:         Mood and Affect: Mood normal.         Behavior: Behavior normal. Behavior is cooperative.         Thought Content: Thought content normal.         Judgment: Judgment normal.

## 2024-12-16 ENCOUNTER — TELEPHONE (OUTPATIENT)
Age: 39
End: 2024-12-16

## 2024-12-16 PROBLEM — F17.210 CIGARETTE SMOKER: Status: ACTIVE | Noted: 2024-12-16

## 2024-12-16 NOTE — TELEPHONE ENCOUNTER
Contacted patient in regards to Routine Referral in attempts to verify patient's needs of services and add patient to proper wait list. Writer left vm to call intake at [phone]    Please add patient to proper WL(s)    Attempt #1

## 2024-12-18 ENCOUNTER — TELEPHONE (OUTPATIENT)
Age: 39
End: 2024-12-18

## 2024-12-18 NOTE — TELEPHONE ENCOUNTER
Patient currently was seen by Odalis Siegel on 12/12 as a new patient. Patient stated he was prescribed Trazodone for sleep.     Patient stated ever since he started the medication he is unable to climax during sexual intercourse. Patient stated he does not have an issue with an erection, just climaxing.     Patient stated he did not take the medication for 2 days and did not have any issue with climaxing during intercourse with his wife. Patient stated when he started the medication again he was unable to climax. Patient is concerned this is due to the medication.     Patient stated he will continue taking the medication but is requesting recommendations.     Please advise  Thank you

## 2024-12-19 DIAGNOSIS — G47.00 INSOMNIA, UNSPECIFIED TYPE: ICD-10-CM

## 2024-12-19 DIAGNOSIS — F32.2 SEVERE MAJOR DEPRESSIVE DISORDER (HCC): Primary | ICD-10-CM

## 2024-12-19 RX ORDER — RAMELTEON 8 MG/1
8 TABLET ORAL
Qty: 30 TABLET | Refills: 0 | Status: SHIPPED | OUTPATIENT
Start: 2024-12-19

## 2024-12-19 NOTE — TELEPHONE ENCOUNTER
Patient returned call. Message relayed. He is asking if there is any other medication the provider can prescribe or would that be something needing to be dicussed at a visit? Please advise.

## 2024-12-20 NOTE — TELEPHONE ENCOUNTER
Contacted patient in regards to Routine Referral in attempts to verify patient's needs of services and add patient to proper wait list. Writer left vm to call intake at [phone]    Please add patient to proper WL(s)    Attempt #2

## 2024-12-27 NOTE — TELEPHONE ENCOUNTER
Contacted patient in regards to Routine Referral in attempts to verify patient's needs of services and add patient to proper wait list. Writer left vm to call intake at [phone]    Please add patient to proper WL(s)    Attempt #3 Referral closed.

## 2024-12-30 DIAGNOSIS — N52.8 OTHER MALE ERECTILE DYSFUNCTION: ICD-10-CM

## 2024-12-30 NOTE — TELEPHONE ENCOUNTER
Reason for call:   [x] Refill   [] Prior Auth  [] Other:     Office:   [] PCP/Provider -   [x] Specialty/Provider - Urology/ MD Jamin    Medication: sildenafil (VIAGRA) 100 mg tablet     Dose/Frequency: Take 1 tablet (100 mg total) by mouth daily as needed for erectile dysfunction    Quantity: 90    Pharmacy: [facility]/pharmacy #[id number] - [place] - [address] [phone]    Does the patient have enough for 3 days?   [] Yes   [x] No - Send as HP to POD

## 2024-12-30 NOTE — TELEPHONE ENCOUNTER
Patient called to request a refill for their Sildenafil advised a refill was requested on 12/30/2024 and is pending approval. Patient verbalized understanding and is in agreement.

## 2024-12-31 RX ORDER — SILDENAFIL 100 MG/1
100 TABLET, FILM COATED ORAL DAILY PRN
Qty: 90 TABLET | Refills: 0 | Status: SHIPPED | OUTPATIENT
Start: 2024-12-31

## 2025-01-09 ENCOUNTER — APPOINTMENT (OUTPATIENT)
Dept: PLASTIC SURGERY | Facility: AMBULATORY SURGERY CENTER | Age: 40
End: 2025-01-09

## 2025-01-10 ENCOUNTER — APPOINTMENT (OUTPATIENT)
Dept: PLASTIC SURGERY | Facility: CLINIC | Age: 40
End: 2025-01-10

## 2025-01-17 ENCOUNTER — APPOINTMENT (OUTPATIENT)
Dept: LAB | Facility: HOSPITAL | Age: 40
End: 2025-01-17

## 2025-01-17 DIAGNOSIS — Z98.84 S/P GASTRIC BYPASS: ICD-10-CM

## 2025-01-17 DIAGNOSIS — Z11.59 NEED FOR HEPATITIS C SCREENING TEST: ICD-10-CM

## 2025-01-17 DIAGNOSIS — Z11.4 SCREENING FOR HIV (HUMAN IMMUNODEFICIENCY VIRUS): ICD-10-CM

## 2025-01-17 LAB
25(OH)D3 SERPL-MCNC: 19.2 NG/ML (ref 30–100)
ALBUMIN SERPL BCG-MCNC: 4.2 G/DL (ref 3.5–5)
ALP SERPL-CCNC: 66 U/L (ref 34–104)
ALT SERPL W P-5'-P-CCNC: 12 U/L (ref 7–52)
ANION GAP SERPL CALCULATED.3IONS-SCNC: 5 MMOL/L (ref 4–13)
AST SERPL W P-5'-P-CCNC: 17 U/L (ref 13–39)
BACTERIA UR QL AUTO: ABNORMAL /HPF
BASOPHILS # BLD AUTO: 0.03 THOUSANDS/ΜL (ref 0–0.1)
BASOPHILS NFR BLD AUTO: 0 % (ref 0–1)
BILIRUB SERPL-MCNC: 0.52 MG/DL (ref 0.2–1)
BILIRUB UR QL STRIP: NEGATIVE
BUN SERPL-MCNC: 12 MG/DL (ref 5–25)
CALCIUM SERPL-MCNC: 9.1 MG/DL (ref 8.4–10.2)
CHLORIDE SERPL-SCNC: 103 MMOL/L (ref 96–108)
CHOLEST SERPL-MCNC: 158 MG/DL (ref ?–200)
CLARITY UR: ABNORMAL
CO2 SERPL-SCNC: 32 MMOL/L (ref 21–32)
COLOR UR: YELLOW
CREAT SERPL-MCNC: 0.98 MG/DL (ref 0.6–1.3)
EOSINOPHIL # BLD AUTO: 0.03 THOUSAND/ΜL (ref 0–0.61)
EOSINOPHIL NFR BLD AUTO: 0 % (ref 0–6)
ERYTHROCYTE [DISTWIDTH] IN BLOOD BY AUTOMATED COUNT: 12.9 % (ref 11.6–15.1)
FERRITIN SERPL-MCNC: 119 NG/ML (ref 24–336)
GFR SERPL CREATININE-BSD FRML MDRD: 96 ML/MIN/1.73SQ M
GLUCOSE P FAST SERPL-MCNC: 100 MG/DL (ref 65–99)
GLUCOSE UR STRIP-MCNC: NEGATIVE MG/DL
HCT VFR BLD AUTO: 42.5 % (ref 36.5–49.3)
HCV AB SER QL: NORMAL
HDLC SERPL-MCNC: 53 MG/DL
HGB BLD-MCNC: 13.5 G/DL (ref 12–17)
HGB UR QL STRIP.AUTO: NEGATIVE
IMM GRANULOCYTES # BLD AUTO: 0.02 THOUSAND/UL (ref 0–0.2)
IMM GRANULOCYTES NFR BLD AUTO: 0 % (ref 0–2)
IRON SATN MFR SERPL: 29 % (ref 15–50)
IRON SERPL-MCNC: 96 UG/DL (ref 50–212)
KETONES UR STRIP-MCNC: NEGATIVE MG/DL
LDLC SERPL CALC-MCNC: 93 MG/DL (ref 0–100)
LEUKOCYTE ESTERASE UR QL STRIP: NEGATIVE
LYMPHOCYTES # BLD AUTO: 1.2 THOUSANDS/ΜL (ref 0.6–4.47)
LYMPHOCYTES NFR BLD AUTO: 16 % (ref 14–44)
MAGNESIUM SERPL-MCNC: 1.9 MG/DL (ref 1.9–2.7)
MCH RBC QN AUTO: 29.4 PG (ref 26.8–34.3)
MCHC RBC AUTO-ENTMCNC: 31.8 G/DL (ref 31.4–37.4)
MCV RBC AUTO: 93 FL (ref 82–98)
MONOCYTES # BLD AUTO: 0.4 THOUSAND/ΜL (ref 0.17–1.22)
MONOCYTES NFR BLD AUTO: 5 % (ref 4–12)
MUCOUS THREADS UR QL AUTO: ABNORMAL
NEUTROPHILS # BLD AUTO: 6.03 THOUSANDS/ΜL (ref 1.85–7.62)
NEUTS SEG NFR BLD AUTO: 79 % (ref 43–75)
NITRITE UR QL STRIP: NEGATIVE
NON-SQ EPI CELLS URNS QL MICRO: ABNORMAL /HPF
NRBC BLD AUTO-RTO: 0 /100 WBCS
PH UR STRIP.AUTO: 7.5 [PH]
PLATELET # BLD AUTO: 298 THOUSANDS/UL (ref 149–390)
PMV BLD AUTO: 9.3 FL (ref 8.9–12.7)
POTASSIUM SERPL-SCNC: 4.2 MMOL/L (ref 3.5–5.3)
PROT SERPL-MCNC: 7.3 G/DL (ref 6.4–8.4)
PROT UR STRIP-MCNC: ABNORMAL MG/DL
RBC # BLD AUTO: 4.59 MILLION/UL (ref 3.88–5.62)
RBC #/AREA URNS AUTO: ABNORMAL /HPF
SODIUM SERPL-SCNC: 140 MMOL/L (ref 135–147)
SP GR UR STRIP.AUTO: 1.02 (ref 1–1.03)
TIBC SERPL-MCNC: 333.2 UG/DL (ref 250–450)
TRANSFERRIN SERPL-MCNC: 238 MG/DL (ref 203–362)
TRIGL SERPL-MCNC: 60 MG/DL (ref ?–150)
TSH SERPL DL<=0.05 MIU/L-ACNC: 1.18 UIU/ML (ref 0.45–4.5)
UIBC SERPL-MCNC: 237 UG/DL (ref 155–355)
UROBILINOGEN UR STRIP-ACNC: <2 MG/DL
VIT B12 SERPL-MCNC: 260 PG/ML (ref 180–914)
WBC # BLD AUTO: 7.71 THOUSAND/UL (ref 4.31–10.16)
WBC #/AREA URNS AUTO: ABNORMAL /HPF

## 2025-01-17 PROCEDURE — 84443 ASSAY THYROID STIM HORMONE: CPT

## 2025-01-17 PROCEDURE — 82306 VITAMIN D 25 HYDROXY: CPT

## 2025-01-17 PROCEDURE — 82607 VITAMIN B-12: CPT

## 2025-01-17 PROCEDURE — 85025 COMPLETE CBC W/AUTO DIFF WBC: CPT

## 2025-01-17 PROCEDURE — 86803 HEPATITIS C AB TEST: CPT

## 2025-01-17 PROCEDURE — 82728 ASSAY OF FERRITIN: CPT

## 2025-01-17 PROCEDURE — 83540 ASSAY OF IRON: CPT

## 2025-01-17 PROCEDURE — 83735 ASSAY OF MAGNESIUM: CPT

## 2025-01-17 PROCEDURE — 36415 COLL VENOUS BLD VENIPUNCTURE: CPT

## 2025-01-17 PROCEDURE — 80061 LIPID PANEL: CPT

## 2025-01-17 PROCEDURE — 81001 URINALYSIS AUTO W/SCOPE: CPT

## 2025-01-17 PROCEDURE — 80053 COMPREHEN METABOLIC PANEL: CPT

## 2025-01-17 PROCEDURE — 87389 HIV-1 AG W/HIV-1&-2 AB AG IA: CPT

## 2025-01-17 PROCEDURE — 83550 IRON BINDING TEST: CPT

## 2025-01-18 LAB
HIV 1+2 AB+HIV1 P24 AG SERPL QL IA: NORMAL
HIV 2 AB SERPL QL IA: NORMAL
HIV1 AB SERPL QL IA: NORMAL
HIV1 P24 AG SERPL QL IA: NORMAL

## 2025-01-20 ENCOUNTER — OFFICE VISIT (OUTPATIENT)
Dept: FAMILY MEDICINE CLINIC | Facility: CLINIC | Age: 40
End: 2025-01-20

## 2025-01-20 ENCOUNTER — RESULTS FOLLOW-UP (OUTPATIENT)
Dept: FAMILY MEDICINE CLINIC | Facility: CLINIC | Age: 40
End: 2025-01-20

## 2025-01-20 VITALS
DIASTOLIC BLOOD PRESSURE: 80 MMHG | BODY MASS INDEX: 25.74 KG/M2 | HEIGHT: 73 IN | SYSTOLIC BLOOD PRESSURE: 124 MMHG | WEIGHT: 194.2 LBS | RESPIRATION RATE: 18 BRPM | OXYGEN SATURATION: 99 % | HEART RATE: 80 BPM

## 2025-01-20 DIAGNOSIS — F32.2 SEVERE MAJOR DEPRESSIVE DISORDER (HCC): ICD-10-CM

## 2025-01-20 DIAGNOSIS — M54.31 SCIATIC NERVE PAIN, RIGHT: ICD-10-CM

## 2025-01-20 DIAGNOSIS — F41.1 GAD (GENERALIZED ANXIETY DISORDER): ICD-10-CM

## 2025-01-20 DIAGNOSIS — E55.9 VITAMIN D DEFICIENCY: ICD-10-CM

## 2025-01-20 DIAGNOSIS — Z00.00 ANNUAL PHYSICAL EXAM: Primary | ICD-10-CM

## 2025-01-20 DIAGNOSIS — F17.210 CIGARETTE SMOKER: ICD-10-CM

## 2025-01-20 PROCEDURE — 99396 PREV VISIT EST AGE 40-64: CPT | Performed by: NURSE PRACTITIONER

## 2025-01-20 PROCEDURE — 99214 OFFICE O/P EST MOD 30 MIN: CPT | Performed by: NURSE PRACTITIONER

## 2025-01-20 RX ORDER — ERGOCALCIFEROL 1.25 MG/1
50000 CAPSULE, LIQUID FILLED ORAL WEEKLY
Qty: 10 CAPSULE | Refills: 0 | Status: SHIPPED | OUTPATIENT
Start: 2025-01-20

## 2025-01-20 RX ORDER — METHOCARBAMOL 750 MG/1
750 TABLET, FILM COATED ORAL EVERY 8 HOURS SCHEDULED
Qty: 30 TABLET | Refills: 1 | Status: SHIPPED | OUTPATIENT
Start: 2025-01-20

## 2025-01-20 NOTE — ASSESSMENT & PLAN NOTE
"Depression Screening Follow-up Plan: Patient's depression screening was positive with a PHQ-9 score of 24. Patient assessed for underlying major depression. They have no active suicidal ideations. Brief counseling provided and recommend additional follow-up/re-evaluation next office visit.  Patient will experience passive suicidal ideation without intent.  He states \"I would never go through with it as my daughter needs me.\"  Advised to be seen in the ER for any return of suicidal ideations with plan and intent, stressed importance.  To begin sertraline 50 mg a half tab x 1 week then 1 tab daily.  Will provide patient to begin counseling with our .  Again, advised patient to be seen by psychiatry as previously advised.  Follow-up in 6 weeks or sooner if needed.  Orders:    sertraline (ZOLOFT) 50 mg tablet; Take 1/2 tab x 1 week then 1 tab daily    "

## 2025-01-20 NOTE — PROGRESS NOTES
"Adult Annual Physical  Name: Jeyson Cvoington      : 1985      MRN: [id number]  Encounter Provider: DEE Rodriguez  Encounter Date: 2025   Encounter department: [facility] [address]    Assessment & Plan  Annual physical exam  Advised to proceed with eye exam and dental cleaning.       Vitamin D deficiency  Reviewed recent labs.  Vitamin D=19.2.  To begin weekly replacement.  Orders:    ergocalciferol (VITAMIN D2) 50,000 units; Take 1 capsule (50,000 Units total) by mouth once a week    DENIZ (generalized anxiety disorder)  Patient reports racing thoughts, feeling on edge and anxious.  DENIZ-7=21.  To begin Zoloft 50 mg a half tab x 1 week then 1 tab daily.  Stressed the importance of self-care, reducing caffeine intake, and engaging in daily physical activity.  Will refer patient to begin counseling.  Orders:    sertraline (ZOLOFT) 50 mg tablet; Take 1/2 tab x 1 week then 1 tab daily    Severe major depressive disorder (HCC)  Depression Screening Follow-up Plan: Patient's depression screening was positive with a PHQ-9 score of 24. Patient assessed for underlying major depression. They have no active suicidal ideations. Brief counseling provided and recommend additional follow-up/re-evaluation next office visit.  Patient will experience passive suicidal ideation without intent.  He states \"I would never go through with it as my daughter needs me.\"  Advised to be seen in the ER for any return of suicidal ideations with plan and intent, stressed importance.  To begin sertraline 50 mg a half tab x 1 week then 1 tab daily.  Will provide patient to begin counseling with our .  Again, advised patient to be seen by psychiatry as previously advised.  Follow-up in 6 weeks or sooner if needed.  Orders:    sertraline (ZOLOFT) 50 mg tablet; Take 1/2 tab x 1 week then 1 tab daily    Sciatic nerve pain, right  Patient has about an hour commute to and from home.  " To begin physical therapy.  Provided written stretches on discharge instructions.  To begin methocarbamol every 8 hours as needed.  Orders:    Ambulatory referral to Physical Therapy; Future    methocarbamol (Robaxin-750) 750 mg tablet; Take 1 tablet (750 mg total) by mouth every 8 (eight) hours    Cigarette smoker  To continue working towards smoking cessation.       Immunizations and preventive care screenings were discussed with patient today. Appropriate education was printed on patient's after visit summary.    Discussed risks and benefits of prostate cancer screening. We discussed the controversial history of PSA screening for prostate cancer in the [place] as well as the risk of over detection and over treatment of prostate cancer by way of PSA screening.  The patient understands that PSA blood testing is an imperfect way to screen for prostate cancer and that elevated PSA levels in the blood may also be caused by infection, inflammation, prostatic trauma or manipulation, urological procedures, or by benign prostatic enlargement.    The role of the digital rectal examination in prostate cancer screening was also discussed and I discussed with him that there is large interobserver variability in the findings of digital rectal examination.    Counseling:  Alcohol/drug use: discussed moderation in alcohol intake, the recommendations for healthy alcohol use, and avoidance of illicit drug use.  Dental Health: discussed importance of regular tooth brushing, flossing, and dental visits.  Injury prevention: discussed safety/seat belts, safety helmets, smoke detectors, carbon monoxide detectors, and smoking near bedding or upholstery.  Sexual health: discussed sexually transmitted diseases, partner selection, use of condoms, avoidance of unintended pregnancy, and contraceptive alternatives.  Exercise: the importance of regular exercise/physical activity was discussed. Recommend exercise 3-5 times per week for at  least 30 minutes.          History of Present Illness     Adult Annual Physical:  Patient presents for annual physical.     Diet and Physical Activity:  - Diet/Nutrition: well balanced diet.  - Exercise: no formal exercise.    Depression Screening:    - PHQ-9 Score: 24    General Health:  - Sleep: sleeps well.  - Hearing: normal hearing bilateral ears.  - Vision: wears glasses and most recent eye exam > 1 year ago.  - Dental: no dental visits for > 1 year. has denture for bottom teeth     Health:    - Urinary symptoms: none.     Review of Systems   Constitutional:  Positive for fatigue. Negative for chills and fever.   HENT: Negative.  Negative for ear pain and sore throat.    Eyes: Negative.  Negative for pain and visual disturbance.   Respiratory: Negative.  Negative for cough and shortness of breath.    Cardiovascular: Negative.  Negative for chest pain and palpitations.   Gastrointestinal: Negative.  Negative for abdominal pain and vomiting.   Endocrine: Negative.    Genitourinary: Negative.  Negative for dysuria and hematuria.   Musculoskeletal:  Positive for back pain. Negative for arthralgias.   Skin: Negative.  Negative for color change and rash.   Allergic/Immunologic: Negative.    Neurological: Negative.  Negative for seizures and syncope.   Hematological: Negative.    Psychiatric/Behavioral:  Positive for agitation, dysphoric mood, sleep disturbance and suicidal ideas (passive, not active). Negative for self-injury. The patient is nervous/anxious.    All other systems reviewed and are negative.    Current Outpatient Medications on File Prior to Visit   Medication Sig Dispense Refill    ramelteon (ROZEREM) 8 mg tablet Take 1 tablet (8 mg total) by mouth daily at bedtime 30 tablet 0    sildenafil (VIAGRA) 100 mg tablet Take 1 tablet (100 mg total) by mouth daily as needed for erectile dysfunction 90 tablet 0     No current facility-administered medications on file prior to visit.        Objective   BP  "124/80 (BP Location: Left arm, Patient Position: Sitting)   Pulse 80   Resp 18   Ht 6' 0.5\" (1.842 m)   Wt 88.1 kg (194 lb 3.2 oz)   SpO2 99%   BMI 25.98 kg/m²     Physical Exam  Vitals and nursing note reviewed.   Constitutional:       General: He is not in acute distress.     Appearance: Normal appearance. He is well-developed. He is not ill-appearing, toxic-appearing or diaphoretic.   HENT:      Head: Normocephalic and atraumatic.      Right Ear: Hearing, tympanic membrane, ear canal and external ear normal.      Left Ear: Hearing, tympanic membrane, ear canal and external ear normal.      Nose: Nose normal.      Mouth/Throat:      Mouth: Mucous membranes are moist.      Pharynx: Oropharynx is clear. Uvula midline.   Eyes:      General: Lids are normal.      Conjunctiva/sclera: Conjunctivae normal.      Pupils: Pupils are equal, round, and reactive to light.   Neck:      Thyroid: No thyromegaly.   Cardiovascular:      Rate and Rhythm: Normal rate and regular rhythm.      Pulses: Normal pulses.      Heart sounds: Normal heart sounds. No murmur heard.  Pulmonary:      Effort: Pulmonary effort is normal. No respiratory distress.      Breath sounds: Normal breath sounds. No wheezing or rales.   Chest:      Chest wall: No tenderness.   Abdominal:      General: Abdomen is flat. Bowel sounds are normal. There is no distension.      Palpations: Abdomen is soft. There is no mass.      Tenderness: There is no abdominal tenderness. There is no guarding or rebound.   Musculoskeletal:         General: No tenderness or deformity. Normal range of motion.      Cervical back: Normal range of motion and neck supple. No rigidity.   Lymphadenopathy:      Cervical: No cervical adenopathy.   Skin:     General: Skin is warm and dry.      Capillary Refill: Capillary refill takes less than 2 seconds.      Coloration: Skin is not pale.      Findings: No erythema or rash.   Neurological:      General: No focal deficit present.      " Mental Status: He is alert and oriented to person, place, and time.      Cranial Nerves: No cranial nerve deficit.   Psychiatric:         Mood and Affect: Mood normal. Affect is tearful.         Behavior: Behavior normal.         Thought Content: Thought content normal.         Judgment: Judgment normal.

## 2025-01-20 NOTE — PATIENT INSTRUCTIONS
Your B12 was on the lower side. Begin B12 1000 mcg daily   Your Vitamin D was low, begin weekly supplement x 10 weeks, Then proceed to taking Vitamin D 2000 Ius daily.

## 2025-01-21 ENCOUNTER — TELEPHONE (OUTPATIENT)
Age: 40
End: 2025-01-21

## 2025-01-21 NOTE — TELEPHONE ENCOUNTER
"Behavioral Health Integration Screening Questionnaire     Are you aware of the referred from your [facility] Provider  : Yes     Please advise interviewee that they need to answer all questions truthfully to allow for best care, and any misrepresentations of information may affect their ability to be seen at this clinic   => Was this discussed? Yes     If Minor Child (under age 18)    Who is/are the legal guardian(s) of the child?     Is there a custody agreement? No     If \"YES\"- Custody orders must be obtained prior to scheduling the first appointment  In addition, Consent to Treatment must be signed by all legal guardians prior to scheduling the first appointment    If \"NO\"- Consent to Treatment must be signed by all legal guardians prior to scheduling the first appointment    Behavioral Health Outpatient Intake History -     Presenting Problem (in patient's own words): Depression and anxiety    Are there any communication barriers for this patient?     No                                               If yes, please describe barriers:       Are you taking any psychiatric medications? Yes     If \"YES\" -What are they Zoloft    If \"YES\" -Who prescribes?     Has the Patient abused alcohol or other substances in the last 6 months ? No  No concerns of substance abuse are reported.     If \"YES\" -What substance, How much, How often?     If illegal substance: Refer to [facility] (for CAR[facility]) or [facility] Offices.   If Alcohol in excess of 10 drinks per week:  Refer to [facility] (for [facility]) or [facility] Offices    ACCEPTED as a patient Yes  If \"Yes\" Appointment Date: 2/4/2025 at 3:00 pm    Referred Elsewhere? No  If “Yes” - (Where? Ex: Therapy Anywhere; [facility];  [facility], etc.)       Name of Insurance Co:No insurance at this time. Pt is waiting from insurance card from job  Insurance ID#   Insurance Phone #   If ins is primary or secondary?   If patient is a minor, parents information " such as Name, D.ORolandoB of guarantor.

## 2025-01-29 ENCOUNTER — TELEPHONE (OUTPATIENT)
Age: 40
End: 2025-01-29

## 2025-01-29 NOTE — TELEPHONE ENCOUNTER
Writer attempted to contact pt regarding appt on 2/4/2025 with Renee Ryan to reschedule appt. Lvm to call writer back.

## 2025-01-30 ENCOUNTER — TELEPHONE (OUTPATIENT)
Age: 40
End: 2025-01-30

## 2025-01-30 DIAGNOSIS — Z31.41 FERTILITY TESTING: Primary | ICD-10-CM

## 2025-01-30 NOTE — TELEPHONE ENCOUNTER
Pt called asking if there was a way to help with sperm analysis, as he and his wife are going to be starting the IVF process, please advise

## 2025-01-31 ENCOUNTER — TELEPHONE (OUTPATIENT)
Age: 40
End: 2025-01-31

## 2025-01-31 DIAGNOSIS — Z31.41 FERTILITY TESTING: Primary | ICD-10-CM

## 2025-01-31 NOTE — TELEPHONE ENCOUNTER
Patient is established with us.  I placed an order for semen analysis to be completed as requested.  Patient can go and obtain a lab with the order in place.  Please call the patient advise him that the order was placed.

## 2025-01-31 NOTE — TELEPHONE ENCOUNTER
Call placed. Spoke to pt and advised that the semen analysis order is placed in patients chart. Advised if any other questions or concerns to give our office a call.

## 2025-01-31 NOTE — TELEPHONE ENCOUNTER
Patient called in today. Patient and spouse are going through the process for IVF. Fertility provider advised patient to call urology and ask us to order semen analysis. PCP also put a urology referral in as well although patient already established. Please advise if we can order this.

## 2025-01-31 NOTE — TELEPHONE ENCOUNTER
Writer attempted to contact pt regarding to reschedule his Integrations appt. Lvm to call writer back.

## 2025-01-31 NOTE — TELEPHONE ENCOUNTER
Writer called pt regarding reschedule is Integrations appt. Writer was told that pt was not at the house at the moment but that he will be back soon. Writer verbalize understanding and stated that will call pt back.

## 2025-02-04 ENCOUNTER — TELEPHONE (OUTPATIENT)
Dept: PSYCHIATRY | Facility: CLINIC | Age: 40
End: 2025-02-04

## 2025-02-04 NOTE — TELEPHONE ENCOUNTER
Writer ISAIAH with information pertaining to appointment on 2/4 as requested by therapist see below.    Ezio Ryany2/4/2025 7:14 AM  Juan Saldana, Today at 3pm I have a new patient 39 year old male scheduled in my OB clinic. I know josy tried to call and reschedule him but I guess he never called back. So whatever - - but can you try and get him today and at least make sure he understands where he needs to go.? He has to come in the front of the building and walk down the hallway on the right to the OB/GYN office lol!

## 2025-02-04 NOTE — TELEPHONE ENCOUNTER
Writer attempted to contact pt regarding Integrations appt on 2/4/2025 at 3:00 pm. Appt was canceled by provider's request. Lvm to call writer back to reschedule.

## 2025-02-12 ENCOUNTER — TELEPHONE (OUTPATIENT)
Dept: PSYCHIATRY | Facility: CLINIC | Age: 40
End: 2025-02-12

## 2025-02-12 NOTE — TELEPHONE ENCOUNTER
Patient returned call and wants to see provider in the family practice and stated they can been seen around 4pm. Patient was scheduled for 3/19 @4pm

## 2025-02-12 NOTE — TELEPHONE ENCOUNTER
L/M in regards to an appointment they were scheduled for on 2/4. Requested a call back if still interested in making an appointment at the family practice.

## 2025-02-22 DIAGNOSIS — F32.2 SEVERE MAJOR DEPRESSIVE DISORDER (HCC): ICD-10-CM

## 2025-02-22 DIAGNOSIS — G47.00 INSOMNIA, UNSPECIFIED TYPE: ICD-10-CM

## 2025-02-24 RX ORDER — RAMELTEON 8 MG/1
8 TABLET ORAL
Qty: 30 TABLET | Refills: 0 | Status: SHIPPED | OUTPATIENT
Start: 2025-02-24

## 2025-03-06 ENCOUNTER — APPOINTMENT (OUTPATIENT)
Dept: PHYSICAL THERAPY | Age: 40
End: 2025-03-06

## 2025-03-06 ENCOUNTER — APPOINTMENT (OUTPATIENT)
Dept: URGENT CARE | Age: 40
End: 2025-03-06

## 2025-03-06 ENCOUNTER — OFFICE VISIT (OUTPATIENT)
Dept: URGENT CARE | Age: 40
End: 2025-03-06

## 2025-03-06 ENCOUNTER — APPOINTMENT (OUTPATIENT)
Dept: RADIOLOGY | Age: 40
End: 2025-03-06

## 2025-03-06 DIAGNOSIS — Z11.1 SCREENING EXAMINATION FOR PULMONARY TUBERCULOSIS: ICD-10-CM

## 2025-03-06 DIAGNOSIS — Z11.1 SCREENING EXAMINATION FOR PULMONARY TUBERCULOSIS: Primary | ICD-10-CM

## 2025-03-06 PROCEDURE — 71045 X-RAY EXAM CHEST 1 VIEW: CPT

## 2025-03-06 PROCEDURE — 97530 THERAPEUTIC ACTIVITIES: CPT | Performed by: SPECIALIST/TECHNOLOGIST

## 2025-03-09 DIAGNOSIS — F32.2 SEVERE MAJOR DEPRESSIVE DISORDER (HCC): ICD-10-CM

## 2025-03-09 DIAGNOSIS — M54.31 SCIATIC NERVE PAIN, RIGHT: ICD-10-CM

## 2025-03-09 DIAGNOSIS — N52.8 OTHER MALE ERECTILE DYSFUNCTION: ICD-10-CM

## 2025-03-09 DIAGNOSIS — G47.00 INSOMNIA, UNSPECIFIED TYPE: ICD-10-CM

## 2025-03-09 DIAGNOSIS — F41.1 GAD (GENERALIZED ANXIETY DISORDER): ICD-10-CM

## 2025-03-10 RX ORDER — RAMELTEON 8 MG/1
8 TABLET ORAL
Qty: 30 TABLET | Refills: 0 | Status: SHIPPED | OUTPATIENT
Start: 2025-03-10

## 2025-03-10 RX ORDER — SILDENAFIL 100 MG/1
100 TABLET, FILM COATED ORAL DAILY PRN
Qty: 90 TABLET | Refills: 0 | Status: SHIPPED | OUTPATIENT
Start: 2025-03-10

## 2025-03-10 RX ORDER — METHOCARBAMOL 750 MG/1
750 TABLET, FILM COATED ORAL EVERY 8 HOURS SCHEDULED
Qty: 30 TABLET | Refills: 0 | Status: SHIPPED | OUTPATIENT
Start: 2025-03-10

## 2025-03-19 ENCOUNTER — TELEPHONE (OUTPATIENT)
Dept: PSYCHIATRY | Facility: CLINIC | Age: 40
End: 2025-03-19

## 2025-03-19 NOTE — TELEPHONE ENCOUNTER
Writer inquired if patient had insurance or is self pay for appointment today. Patient stated they have been trying to reschedule all day but could not get through. Patient requested to have an appointment after April 1st as that's when his insurance is effective. Writer gave available slots that can be rescheduled, patient requested anytime after 4pm. Writer informed the earliest appointment around that time would be April 30th. Patient requested to put in for that date for now but if any date earlier at 4pm opens up, they would like to take that slot. Patient also stated they would like a call back sooner to the appointment to get the insurance as they are waiting for that information to come in the mail.

## 2025-04-06 DIAGNOSIS — F32.2 SEVERE MAJOR DEPRESSIVE DISORDER (HCC): ICD-10-CM

## 2025-04-06 DIAGNOSIS — G47.00 INSOMNIA, UNSPECIFIED TYPE: ICD-10-CM

## 2025-04-10 RX ORDER — RAMELTEON 8 MG/1
8 TABLET ORAL
Qty: 90 TABLET | Refills: 1 | Status: SHIPPED | OUTPATIENT
Start: 2025-04-10

## 2025-04-29 ENCOUNTER — OFFICE VISIT (OUTPATIENT)
Dept: URGENT CARE | Facility: CLINIC | Age: 40
End: 2025-04-29
Payer: COMMERCIAL

## 2025-04-29 VITALS
RESPIRATION RATE: 18 BRPM | BODY MASS INDEX: 27.29 KG/M2 | TEMPERATURE: 98 F | OXYGEN SATURATION: 98 % | DIASTOLIC BLOOD PRESSURE: 73 MMHG | SYSTOLIC BLOOD PRESSURE: 118 MMHG | HEART RATE: 72 BPM | WEIGHT: 204 LBS

## 2025-04-29 DIAGNOSIS — R19.7 NAUSEA VOMITING AND DIARRHEA: ICD-10-CM

## 2025-04-29 DIAGNOSIS — A08.4 VIRAL GASTROENTERITIS: Primary | ICD-10-CM

## 2025-04-29 DIAGNOSIS — R11.2 NAUSEA VOMITING AND DIARRHEA: ICD-10-CM

## 2025-04-29 PROCEDURE — S9083 URGENT CARE CENTER GLOBAL: HCPCS | Performed by: PHYSICIAN ASSISTANT

## 2025-04-29 PROCEDURE — G0382 LEV 3 HOSP TYPE B ED VISIT: HCPCS | Performed by: PHYSICIAN ASSISTANT

## 2025-04-29 RX ORDER — ONDANSETRON 4 MG/1
4 TABLET, FILM COATED ORAL EVERY 8 HOURS PRN
Qty: 20 TABLET | Refills: 0 | Status: SHIPPED | OUTPATIENT
Start: 2025-04-29

## 2025-04-29 NOTE — PATIENT INSTRUCTIONS
Advised to stay well hydrated and drink plenty of fluids.  Use zofran as needed.   Take over-the-counter medication as needed for symptomatic management.    Follow up with PCP in 3-5 days.  Proceed to  ER if symptoms worsen.    If tests are performed, our office will contact you with results only if changes need to made to the care plan discussed with you at the visit. You can review your full results on [facility] My[facility].

## 2025-04-29 NOTE — LETTER
April 29, 2025     Patient: Jeyson Covington   YOB: 1985   Date of Visit: 4/29/2025       To Whom it May Concern:    Jeyson Covington was seen in my clinic on 4/29/2025. He may return to work on 4/30/2025 .    If you have any questions or concerns, please don't hesitate to call.         Sincerely,          Loren Arauz PA-C        CC: No Recipients

## 2025-04-30 ENCOUNTER — OFFICE VISIT (OUTPATIENT)
Dept: BEHAVIORAL/MENTAL HEALTH CLINIC | Facility: CLINIC | Age: 40
End: 2025-04-30
Payer: COMMERCIAL

## 2025-04-30 DIAGNOSIS — F32.2 SEVERE MAJOR DEPRESSIVE DISORDER (HCC): Primary | ICD-10-CM

## 2025-04-30 PROCEDURE — 90791 PSYCH DIAGNOSTIC EVALUATION: CPT

## 2025-04-30 NOTE — BH CRISIS PLAN
Client Name: Jeyson Covington       Client YOB: 1985    [facility] Safety Plan      Creation Date: 4/30/25 Update Date: 4/30/25   Created By: Renee Ryan Last Updated By: Renee Ryan      Step 1: Warning Signs:   Warning Signs   overthinking - thoughts racing   more irritable   Will feel really hot like a heat flash   Vision will get blurry and body gets shaky            Step 2: Internal Coping Strategies:   Internal Coping Strategies   Work   Driving around listening to music   watching tv in garage            Step 3: People and social settings that provide distraction:   Name Contact Information   Fionna/ fiance number in phone   Mark/ friend number in phone   Rebecca/ friend number in phone   Martita/ sister number in phone    Places   The garage           Step 4: People whom I can ask for help during a crisis:      Name Contact Information    Fionna/ fiance number in phone      Step 5: Professionals or agencies I can contact during a crisis:      Clinican/Agency Name Phone Emergency Contact    Renee Ryan/ [facility] therapist [facility]/ [phone]       Local Emergency Department Emergency Department Phone Emergency Department Address    [facility] [phone] [address]        Crisis Phone Numbers:   Suicide Prevention Lifeline: Call or Text  292 Crisis Text Line: Text HOME to 989-202   Please note: Some [facility] do not have a separate number for Child/Adolescent specific crisis. If your county is not listed under Child/Adolescent, please call the adult number for your county      Adult Crisis Numbers: Child/Adolescent Crisis Numbers   [facility]: [phone] [facility]: [phone]   [facility]: [phone] [facility]: [phone]   [facility]: [phone] [place]: [phone]   [facility]: [phone] [place]: [phone]   [place]: [phone]   [facility]  "[facility]: [phone]   [facility]: [phone]   [facility]: [phone] (daytime) [phone] (after hours, weekends, holidays)      Step 6: Making the environment safer (plan for lethal means safety):   Patient did not identify any lethal methods: Yes     Optional: What is most important to me and worth living for?   \"My family\"     [facility]ardo Safety Plan. Dot Mobley and Brent Song. Used with permission of the authors.           "
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
## 2025-04-30 NOTE — BH TREATMENT PLAN
Outpatient Behavioral Health Psychotherapy Treatment Plan    Jeyson Covington  1985     Date of Initial Psychotherapy Assessment: 4/30/2025   Date of Current Treatment Plan: 04/30/25  Treatment Plan Target Date: TBD  Treatment Plan Expiration Date: 10/29/2025    Diagnosis:   1. Severe major depressive disorder (HCC)            Area(s) of Need: depression and anxiety, and panic attacks    Long Term Goal 1 (in the client's own words): Be able to control my anger and have lower anxiety    Stage of Change: Preparation    Target Date for completion: TBD     Anticipated therapeutic modalities: CBT, DBT, CPT, MI, client-centered, mindfulness     People identified to complete this goal: Therapist and Rolan    Objective 1: (identify the means of measuring success in meeting the objective): Over the next 6 months, Rolan and therapist will explore and work on processing situations and experiences both past and present that contribute to his anxiety.                    Objective 2: (identify the means of measuring success in meeting the objective): Over the next 6 months,Rolan will learn and verbalize understanding of how brain processes anxiety triggers (fight/flight/freeze) response, and the physiological response of his body when this occurs.             Objective 3: Over the next 6 months, Rolan will learn at least 2 grounding skills and utilize them when recognizing anxiety or panic attacks.             Objective 4: Rolan  and therapist will explore areas in daily experiences that are in his control, and recognize areas that are not in his control.             Objective 5: Rolan will learn and utilize at least 2 DBT distress tolerance skills over the next 6 months when he is experiencing feelings of anxiety and depression.             Objective 6: Over the next 6 months, Rolan will learn common thought pattern errors (cognitive distortions) and write these down to review when he recognizes them in his thinking.             Objective 7: Rolan will learn and utilize thought trial technique for cognitive distortions as he recognizes them over the next 6 months.             Objective 8: Rolan will write down one positive things daily.                Long Term Goal 2 (in the client's own words): Medication Management     Stage of Change: Action     Target Date for completion: TBD             Anticipated therapeutic modalities: Medication Management             People identified to complete this goal: Medication provider and                     Objective 1: (identify the means of measuring success in meeting the objective): Take medications as prescribed                    Objective 2: (identify the means of measuring success in meeting the objective): Attend appointments as scheduled and discuss efficacy of medications with PCP            I am currently under the care of a [facility] psychiatric provider: no    My [facility] psychiatric provider is: n/a PCP managing meds    I am currently taking psychiatric medications: Yes, but not as prescribed. (explain) on zoloft but says hasn't been taking due to some side effects - will discuss with his PCP tomorrow.    I feel that I will be ready for discharge from mental health care when I reach the following (measurable goal/objective): I will start laughing and joking around again    For children and adults who have a legal guardian:   Has there been any change to custody orders and/or guardianship status? NA. If yes, attach updated documentation.    I have created my Crisis Plan and have been offered a copy of this plan    Behavioral Health Treatment Plan [facility]: Diagnosis and Treatment Plan explained to Jeyson Covington acknowledges an understanding of their diagnosis. Jeyson Covington agrees to this treatment plan.    I have been offered a copy of this Treatment Plan. yes

## 2025-05-01 ENCOUNTER — OFFICE VISIT (OUTPATIENT)
Dept: FAMILY MEDICINE CLINIC | Facility: CLINIC | Age: 40
End: 2025-05-01
Payer: COMMERCIAL

## 2025-05-01 VITALS
WEIGHT: 202 LBS | DIASTOLIC BLOOD PRESSURE: 70 MMHG | HEART RATE: 74 BPM | RESPIRATION RATE: 18 BRPM | SYSTOLIC BLOOD PRESSURE: 106 MMHG | BODY MASS INDEX: 26.77 KG/M2 | OXYGEN SATURATION: 98 % | HEIGHT: 73 IN

## 2025-05-01 DIAGNOSIS — N52.8 OTHER MALE ERECTILE DYSFUNCTION: ICD-10-CM

## 2025-05-01 DIAGNOSIS — F17.210 CIGARETTE SMOKER: ICD-10-CM

## 2025-05-01 DIAGNOSIS — F41.1 GAD (GENERALIZED ANXIETY DISORDER): Primary | ICD-10-CM

## 2025-05-01 DIAGNOSIS — G47.00 INSOMNIA, UNSPECIFIED TYPE: ICD-10-CM

## 2025-05-01 DIAGNOSIS — F32.2 SEVERE MAJOR DEPRESSIVE DISORDER (HCC): ICD-10-CM

## 2025-05-01 PROCEDURE — 99214 OFFICE O/P EST MOD 30 MIN: CPT | Performed by: NURSE PRACTITIONER

## 2025-05-01 RX ORDER — BUSPIRONE HYDROCHLORIDE 7.5 MG/1
7.5 TABLET ORAL 3 TIMES DAILY
Qty: 90 TABLET | Refills: 1 | Status: SHIPPED | OUTPATIENT
Start: 2025-05-01

## 2025-05-01 NOTE — ASSESSMENT & PLAN NOTE
Currently smoking half a pack a day, thinking about quitting.  Reviewed options for patient.  Counseled on the importance of smoking cessation and the negative health effects of tobacco abuse.

## 2025-05-01 NOTE — PROGRESS NOTES
Name: Jeyson Covington      : 1985      MRN: [id number]  Encounter Provider: DEE Rodriguez  Encounter Date: 2025   Encounter department: [facility] [address]  :  Assessment & Plan  DENIZ (generalized anxiety disorder)  Patient stopped sertraline due to sexual side effects.  To begin buspirone 7.5 mg 3 times daily for anxiety.  To continue counseling.  Follow-up in 1 month if symptoms not improved or sooner if symptoms worsen.  Orders:    busPIRone (BUSPAR) 7.5 mg tablet; Take 1 tablet (7.5 mg total) by mouth 3 (three) times a day    Severe major depressive disorder (HCC)  Stable.  Continue counseling.     Orders:    busPIRone (BUSPAR) 7.5 mg tablet; Take 1 tablet (7.5 mg total) by mouth 3 (three) times a day    Insomnia, unspecified type  Stop Rozerem as he felt this was contributing to sexual dysfunction.  His sleeping about 4 to 5 hours a night but wakes up feeling fatigued.  Advised to begin melatonin as needed for sleep.  Counseled on proper sleep hygiene.  Orders:    busPIRone (BUSPAR) 7.5 mg tablet; Take 1 tablet (7.5 mg total) by mouth 3 (three) times a day    Cigarette smoker  Currently smoking half a pack a day, thinking about quitting.  Reviewed options for patient.  Counseled on the importance of smoking cessation and the negative health effects of tobacco abuse.         Other male erectile dysfunction  To continue Viagra 100 mg as needed.              History of Present Illness   Jeyson presents with his wife for a follow-up.  He stopped his Rozerem and sertraline 3 weeks as he felt it was contributing to sexual dysfunction.  He continues to struggle with anxiety, depression, and difficulty sleeping.  He recently started counseling with our  Renee and feels this has been helpful.      Review of Systems   Constitutional: Negative.    HENT: Negative.     Eyes: Negative.    Respiratory: Negative.     Cardiovascular: Negative.   "  Gastrointestinal: Negative.    Endocrine: Negative.    Genitourinary: Negative.    Musculoskeletal: Negative.    Skin: Negative.    Allergic/Immunologic: Negative.    Neurological: Negative.    Hematological: Negative.    Psychiatric/Behavioral:  Positive for agitation, decreased concentration, dysphoric mood and sleep disturbance. Negative for self-injury and suicidal ideas. The patient is nervous/anxious.        Objective   /70 (BP Location: Left arm, Patient Position: Sitting, Cuff Size: Standard)   Pulse 74   Resp 18   Ht 6' 0.5\" (1.842 m)   Wt 91.6 kg (202 lb)   SpO2 98%   BMI 27.02 kg/m²      Physical Exam  Vitals and nursing note reviewed.   Constitutional:       General: He is not in acute distress.     Appearance: Normal appearance. He is well-developed. He is not ill-appearing, toxic-appearing or diaphoretic.   HENT:      Head: Normocephalic and atraumatic.   Eyes:      Conjunctiva/sclera: Conjunctivae normal.   Cardiovascular:      Rate and Rhythm: Normal rate and regular rhythm.      Heart sounds: Normal heart sounds. No murmur heard.  Pulmonary:      Effort: Pulmonary effort is normal. No respiratory distress.      Breath sounds: Normal breath sounds. No wheezing or rales.   Chest:      Chest wall: No tenderness.   Musculoskeletal:      Cervical back: Neck supple.   Skin:     General: Skin is warm and dry.      Capillary Refill: Capillary refill takes less than 2 seconds.   Neurological:      General: No focal deficit present.      Mental Status: He is alert and oriented to person, place, and time.   Psychiatric:         Mood and Affect: Mood normal.         Behavior: Behavior normal.         Thought Content: Thought content normal.         Judgment: Judgment normal.         "

## 2025-05-01 NOTE — ASSESSMENT & PLAN NOTE
Stable.  Continue counseling.     Orders:    busPIRone (BUSPAR) 7.5 mg tablet; Take 1 tablet (7.5 mg total) by mouth 3 (three) times a day

## 2025-05-14 ENCOUNTER — TELEPHONE (OUTPATIENT)
Dept: BEHAVIORAL/MENTAL HEALTH CLINIC | Facility: CLINIC | Age: 40
End: 2025-05-14

## 2025-05-14 NOTE — TELEPHONE ENCOUNTER
Called patient to see if he would like an appointment slot next Wed or Thursday as there were a few cancellation spots available. Left message and number for callback to scheduling.

## 2025-05-17 DIAGNOSIS — N52.8 OTHER MALE ERECTILE DYSFUNCTION: ICD-10-CM

## 2025-05-19 ENCOUNTER — TELEPHONE (OUTPATIENT)
Age: 40
End: 2025-05-19

## 2025-05-19 RX ORDER — SILDENAFIL 100 MG/1
100 TABLET, FILM COATED ORAL DAILY PRN
Qty: 90 TABLET | Refills: 1 | Status: SHIPPED | OUTPATIENT
Start: 2025-05-19

## 2025-05-19 NOTE — TELEPHONE ENCOUNTER
PA for SILDENAFIL 10 MG  DENIED    Reason:(Screenshot if applicable)    NOT A COVERED BENEFIT    PT CAN USE GOOD RX  PHARMACY AWARE OF DENIAL

## 2025-06-12 ENCOUNTER — APPOINTMENT (EMERGENCY)
Dept: RADIOLOGY | Facility: HOSPITAL | Age: 40
End: 2025-06-12
Payer: COMMERCIAL

## 2025-06-12 ENCOUNTER — HOSPITAL ENCOUNTER (EMERGENCY)
Facility: HOSPITAL | Age: 40
Discharge: HOME | End: 2025-06-12
Attending: EMERGENCY MEDICINE | Admitting: EMERGENCY MEDICINE
Payer: COMMERCIAL

## 2025-06-12 ENCOUNTER — APPOINTMENT (EMERGENCY)
Dept: CT IMAGING | Facility: HOSPITAL | Age: 40
End: 2025-06-12
Payer: COMMERCIAL

## 2025-06-12 VITALS
OXYGEN SATURATION: 97 % | RESPIRATION RATE: 18 BRPM | DIASTOLIC BLOOD PRESSURE: 78 MMHG | HEART RATE: 70 BPM | TEMPERATURE: 98.3 F | SYSTOLIC BLOOD PRESSURE: 132 MMHG

## 2025-06-12 DIAGNOSIS — M54.9 BACK PAIN: ICD-10-CM
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
## 2025-06-12 DIAGNOSIS — M54.2 NECK PAIN: ICD-10-CM

## 2025-06-12 PROCEDURE — 73564 X-RAY EXAM KNEE 4 OR MORE: CPT

## 2025-06-12 PROCEDURE — 99284 EMERGENCY DEPT VISIT MOD MDM: CPT

## 2025-06-12 PROCEDURE — 70450 CT HEAD/BRAIN W/O DYE: CPT

## 2025-06-12 PROCEDURE — 99284 EMERGENCY DEPT VISIT MOD MDM: CPT | Performed by: EMERGENCY MEDICINE

## 2025-06-12 PROCEDURE — 71250 CT THORAX DX C-: CPT

## 2025-06-12 PROCEDURE — 72125 CT NECK SPINE W/O DYE: CPT

## 2025-06-12 PROCEDURE — 74176 CT ABD & PELVIS W/O CONTRAST: CPT

## 2025-06-12 RX ORDER — ACETAMINOPHEN 325 MG/1
650 TABLET ORAL ONCE
Status: COMPLETED | OUTPATIENT
Start: 2025-06-12 | End: 2025-06-12

## 2025-06-12 RX ORDER — HYDROCODONE BITARTRATE AND ACETAMINOPHEN 5; 325 MG/1; MG/1
1 TABLET ORAL ONCE
Refills: 0 | Status: DISCONTINUED | OUTPATIENT
Start: 2025-06-12 | End: 2025-06-12 | Stop reason: HOSPADM

## 2025-06-12 RX ADMIN — ACETAMINOPHEN 650 MG: 325 TABLET ORAL at 16:50

## 2025-06-12 NOTE — INCIDENTAL FINDINGS
The following findings require follow up:  Radiographic finding   Finding: sclerotic lesion left shoulder   Follow up required: shoulder MRI   Follow up should be done within 1 week(s)    Please notify the following clinician to assist with the follow up:   DEE Rodriguez    Incidental finding results were discussed with the Patient by Carisa Trevino MD on 06/12/25.   They expressed understanding and all questions answered.
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
## 2025-06-13 ENCOUNTER — OFFICE VISIT (OUTPATIENT)
Dept: FAMILY MEDICINE CLINIC | Facility: CLINIC | Age: 40
End: 2025-06-13

## 2025-06-13 VITALS
OXYGEN SATURATION: 95 % | HEART RATE: 81 BPM | BODY MASS INDEX: 25.84 KG/M2 | SYSTOLIC BLOOD PRESSURE: 118 MMHG | DIASTOLIC BLOOD PRESSURE: 82 MMHG | WEIGHT: 195 LBS | HEIGHT: 73 IN

## 2025-06-13 DIAGNOSIS — R93.89 ABNORMAL CT OF THE CHEST: ICD-10-CM

## 2025-06-13 DIAGNOSIS — M25.512 ACUTE SHOULDER PAIN DUE TO TRAUMA, LEFT: ICD-10-CM

## 2025-06-13 DIAGNOSIS — M54.2 NECK PAIN: ICD-10-CM
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
## 2025-06-13 DIAGNOSIS — M25.561 ACUTE PAIN OF RIGHT KNEE: ICD-10-CM

## 2025-06-13 DIAGNOSIS — M54.9 ACUTE MIDLINE BACK PAIN, UNSPECIFIED BACK LOCATION: ICD-10-CM

## 2025-06-13 DIAGNOSIS — G89.11 ACUTE SHOULDER PAIN DUE TO TRAUMA, LEFT: ICD-10-CM

## 2025-06-13 PROCEDURE — 99214 OFFICE O/P EST MOD 30 MIN: CPT | Performed by: NURSE PRACTITIONER

## 2025-06-13 RX ORDER — OXYCODONE AND ACETAMINOPHEN 5; 325 MG/1; MG/1
1 TABLET ORAL EVERY 4 HOURS PRN
Qty: 20 TABLET | Refills: 0 | Status: SHIPPED | OUTPATIENT
Start: 2025-06-13

## 2025-06-13 RX ORDER — METHYLPREDNISOLONE 4 MG/1
TABLET ORAL
Qty: 21 EACH | Refills: 0 | Status: SHIPPED | OUTPATIENT
Start: 2025-06-13

## 2025-06-13 RX ORDER — METHOCARBAMOL 500 MG/1
500 TABLET, FILM COATED ORAL 3 TIMES DAILY PRN
Qty: 20 TABLET | Refills: 0 | Status: SHIPPED | OUTPATIENT
Start: 2025-06-13
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
## 2025-06-19 ENCOUNTER — TELEPHONE (OUTPATIENT)
Age: 40
End: 2025-06-19

## 2025-06-19 NOTE — TELEPHONE ENCOUNTER
Patient stated Dr. Johnson was going to see him today for his back and tomorrow for his knee, but was informed by the office that the doctor can evaluate both body parts tomorrow. Unfortunately, he had to reschedule to Monday, 6/23/25. He would like to confirm that Dr. Johnson will still see him for both body parts during the same visit.
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
## 2025-06-19 NOTE — ED ADULT NURSE NOTE - HISTORY OF COVID-19 VACCINATION
Yes
As certified below, I, or a nurse practitioner or physician assistant working with me, had a face-to-face encounter that meets the physician face-to-face encounter requirements.

## 2025-06-23 VITALS — BODY MASS INDEX: 26.9 KG/M2 | WEIGHT: 203 LBS | HEIGHT: 73 IN

## 2025-06-23 DIAGNOSIS — M25.561 ACUTE PAIN OF RIGHT KNEE: Primary | ICD-10-CM

## 2025-06-23 DIAGNOSIS — M54.50 MYOFASCIAL LOW BACK PAIN: ICD-10-CM

## 2025-06-23 PROCEDURE — 99204 OFFICE O/P NEW MOD 45 MIN: CPT | Performed by: FAMILY MEDICINE

## 2025-06-23 RX ORDER — NAPROXEN 500 MG/1
500 TABLET ORAL 2 TIMES DAILY WITH MEALS
Qty: 60 TABLET | Refills: 0 | Status: SHIPPED | OUTPATIENT
Start: 2025-06-23

## 2025-06-23 RX ORDER — METHOCARBAMOL 500 MG/1
500 TABLET, FILM COATED ORAL 4 TIMES DAILY
Qty: 90 TABLET | Refills: 0 | Status: SHIPPED | OUTPATIENT
Start: 2025-06-23
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
## 2025-06-26 ENCOUNTER — TELEPHONE (OUTPATIENT)
Age: 40
End: 2025-06-26

## 2025-06-26 NOTE — TELEPHONE ENCOUNTER
Patient states he is stuck at the dentist office and needs to reschedule his appointment. Please call patient back to reschedule

## 2025-07-05 DIAGNOSIS — F32.2 SEVERE MAJOR DEPRESSIVE DISORDER (HCC): ICD-10-CM

## 2025-07-05 DIAGNOSIS — F41.1 GAD (GENERALIZED ANXIETY DISORDER): ICD-10-CM

## 2025-07-05 DIAGNOSIS — G47.00 INSOMNIA, UNSPECIFIED TYPE: ICD-10-CM

## 2025-07-08 RX ORDER — BUSPIRONE HYDROCHLORIDE 7.5 MG/1
7.5 TABLET ORAL 3 TIMES DAILY
Qty: 270 TABLET | Refills: 1 | Status: SHIPPED | OUTPATIENT
Start: 2025-07-08

## 2025-07-09 ENCOUNTER — DOCUMENTATION (OUTPATIENT)
Dept: BEHAVIORAL/MENTAL HEALTH CLINIC | Facility: CLINIC | Age: 40
End: 2025-07-09

## 2025-07-09 ENCOUNTER — TELEPHONE (OUTPATIENT)
Dept: PSYCHIATRY | Facility: CLINIC | Age: 40
End: 2025-07-09

## 2025-07-09 NOTE — PROGRESS NOTES
Patient had 3pm scheduled with this therapist. He checked in at the front window at 2:55pm. Therapist went out to get him at 3pm and he was not present.  said he went out the door on the phone so thought he was taking a call. Therapist went outside to look for him and did not see him. Waited another 10 minutes and he still was not present.  Therapist called and left him a voicemail asking him to present back to waiting area and as of 3:28pm he is not present.

## 2025-07-09 NOTE — TELEPHONE ENCOUNTER
Writer attempted to contact pt in regards to their appointment they missed today. Writer requested pt call back to reschedule.

## 2025-07-23 ENCOUNTER — DOCUMENTATION (OUTPATIENT)
Dept: BEHAVIORAL/MENTAL HEALTH CLINIC | Facility: CLINIC | Age: 40
End: 2025-07-23

## 2025-07-23 DIAGNOSIS — F32.2 SEVERE MAJOR DEPRESSIVE DISORDER (HCC): Primary | ICD-10-CM

## 2025-07-23 NOTE — PROGRESS NOTES
Psychotherapy Discharge Summary    Preferred Name: Jeyson Covington  YOB: 1985    Admission date to psychotherapy: 4/30/3035    Referred by: PCP    Presenting Problem: Major Depression    Course of treatment included : only seen for initial assessment, then multiple no shows    Progress/Outcome of Treatment Goals (brief summary of course of treatment) Jeyson was seen on 4/30/2025 for initial assessment. He then no showed on 5/22/25, 6/26/25, 7/9/25, and again today 7/23/25. On 6/26/25 he had called afterwards in the chart and stated he was stuck at the dentist, so provided allowed next scheduled appt on 7/9. On 7/9 he came into the practice and initially checked in at , then at scheduled time when provider went to waiting room for him the  stated he got on a phone call and left. He did not return and did not return phone calls. He then no showed today 7/23/25. Discharging at this time.     Treatment Complications (if any): see above    Treatment Progress: poor    Current [facility] Psychiatric Provider: n/a    Discharge Medications include: see med list    Discharge Date: 4/30/2025 date of only and last appt.     Discharge Diagnosis:   1. Severe major depressive disorder (HCC)            Criteria for Discharge: two or more unexcused absences for services    Patient is not cleared to return to [facility] for continued treatment.    Rationale: This provider will not accommodate this patient again due to the above rationale.    Aftercare recommendations include (include specific referral names and phone numbers, if appropriate): He should seek psychotherapy with another therapist that he may connect with better. There may have been financial concerns but it is unclear since he does not return our attempts to reach out.     Prognosis: fair